# Patient Record
Sex: FEMALE | Race: ASIAN | NOT HISPANIC OR LATINO | URBAN - METROPOLITAN AREA
[De-identification: names, ages, dates, MRNs, and addresses within clinical notes are randomized per-mention and may not be internally consistent; named-entity substitution may affect disease eponyms.]

---

## 2024-04-23 NOTE — PROGRESS NOTES
Beatriz Mandujano female   1985 38 y.o.   [id number]      Chief Complaint  High risk surveillance care, annual mammogram and exam    History Of Present Illness  Beatriz Mandujano is a very pleasant 38 year old  woman [facility] care. She has family history of breast cancer in her mother, age 49. Her mother had genetic testing in 2022, 77 gene panel, negative. She denies breast surgery or biopsy.     BREAST IMAGIN2023 Bilateral screening mammogram, indicates BI-RADS Category 1.      FEMALE HISTORY: menarche age 14, , first birth age 29,  x 6 years, no OCP's, premenopausal, LMP 2024, regular monthly cycles,  had vasectomy, heterogeneously dense tissue     FAMILY CANCER HISTORY:  Mother: Breast cancer, age 49, ILC/LCIS treated with neoadjuvant chemo, bilateral mastectomy and post-operative radiation, negative genetics, also has history of neck cancer, alive and well  Maternal Aunt: Brain cancer, 50s         Surgical History  She has a past surgical history that includes  section, low transverse (10/09/2017) and Other surgical history (10/09/2017).     Social History  She reports that she has never smoked. She has never used smokeless tobacco. No history on file for alcohol use and drug use.    Family History  Family History   Problem Relation Name Age of Onset    Breast cancer Mother  49    Ovarian cancer Father's Sister  60        Allergies  Patient has no known allergies.    Medications  No current outpatient medications      REVIEW OF SYSTEMS    Constitutional:  Negative for appetite change, fatigue, fever and unexpected weight change.   HENT:  Negative for ear pain, hearing loss, nosebleeds, sore throat and trouble swallowing.    Eyes:  Negative for discharge, itching and visual disturbance.   Respiratory:  Negative for cough, chest tightness and shortness of breath.    Cardiovascular:  Negative for chest pain,  palpitations and leg swelling.   Breast: as indicated in HPI  Gastrointestinal:  Negative for abdominal pain, constipation, diarrhea and nausea.   Endocrine: Negative for cold intolerance and heat intolerance.   Genitourinary:  Negative for dysuria, frequency, hematuria, pelvic pain and vaginal bleeding.   Musculoskeletal:  Negative for arthralgias, back pain, gait problem, joint swelling and myalgias.   Skin:  Negative for color change and rash.   Allergic/Immunologic: Negative for environmental allergies and food allergies.   Neurological:  Negative for dizziness, tremors, speech difficulty, weakness, numbness and headaches.   Hematological:  Does not bruise/bleed easily.   Psychiatric/Behavioral:  Negative for agitation, dysphoric mood and sleep disturbance. The patient is not nervous/anxious.         Past Medical History  She has a past medical history of Cluster headache syndrome, unspecified, not intractable (02/12/2018), Migraine without aura, not intractable, without status migrainosus (02/12/2018), and Personal history of other specified conditions.     Physical Exam  Patient is alert and oriented x3 and in a relaxed and appropriate mood. Her gait is steady and hand grasps are equal. Sclera is clear. The breasts are full and nearly symmetrical. The tissue is dense centrally and softer below without palpable abnormalities, discrete nodules or masses. The skin and nipples appear normal. There is a superficial skin tear of right breast at 5:30, 7 cm from the nipple. There is no cervical, supraclavicular or axillary lymphadenopathy. Heart rate and rhythm normal, S1 and S2 appreciated. The lungs are clear to auscultation bilaterally. Abdomen is soft and non-tender.     Physical Exam     Last Recorded Vitals  Vitals:    05/02/24 0805   BP: 104/71   Pulse: 57   SpO2: 97%       Relevant Results   Time was spent viewing digital images of the radiology testing with the patient. I explained the results in depth, along  with suggested explanation for follow up recommendations based on the testing results. BI-RADS Category 1    Imaging  Narrative & Impression   Interpreted By:  Jose Muñoz,   STUDY:  BI MAMMO BILATERAL SCREENING TOMOSYNTHESIS;  5/2/2024 8:04 am      ACCESSION NUMBER(S):  [id number]      ORDERING CLINICIAN:  AARON BAILEY      INDICATION:  Screening.      COMPARISON:  05/01/2023 and 04/22/2022      FINDINGS:  2D and tomosynthesis images were reviewed at 1 mm slice thickness.      Density:  There are areas of scattered fibroglandular tissue.      No suspicious masses or calcifications are identified.      IMPRESSION:  No mammographic evidence of malignancy.      BI-RADS CATEGORY:      BI-RADS Category:  1 Negative.  Recommendation:  Annual Screening.  Recommended Date:  1 Year.  Laterality:  Bilateral.           Assessment/Plan   High risk surveillance care, normal clinical exam and imaging, family history of breast cancer, heterogeneously dense tissue     Plan: Return in one year for bilateral screening mammogram and office visit. Fast MRI optional in November 2024, five year risk 0.7%. Endocrine therapy not recommended at this time.     Patient Discussion/Summary  Your clinical examination and imaging are normal. Please return in one year for bilateral screening mammogram and office visit or sooner if you have any problems or concerns.     High risk breast surveillance care plan:  Yearly mammogram with digital breast tomosynthesis  Twice yearly clinical breast examinations  Breast MRI - Fast MRI optional in November 2024  Monthly self breast examinations  Vitamin D3 2000 IU/daily (over the counter)  Exercise 3-4 times per week for 45-60 minutes  Limit alcohol to 3-4 drinks per week   Eat a healthy low-fat diet with lots of fruits and vegetables  Risk models indicate personal risk of breast cancer in the next five years and lifetime (age 90)  Breast Cancer Risk Assessment Tool (Chen): 5 year risk 0.7%  (average 0.3%) and lifetime risk 19.1% (average 12.5%)  Leslie: 5 year risk 0.7% (average 0.4%) and lifetime risk 19.7% (average 11%)     You can see your health information, review clinical summaries from office visits & test results online when you follow your health with MY  Chart, a personal health record. To sign up go to [email or web address]. If you need assistance with signing up or trouble getting into your account call Patient Communicator Patient Line 24/7 at [phone].    My office phone number is [phone] if you need to get in touch with me or have additional questions or concerns. Thank you for choosing [facility] and trusting me as your healthcare provider. I look forward to seeing you again at your next office visit. I am honored to be a provider on your health care team and I remain dedicated to helping you achieve your health goals.      Violette Styles, APRN-CNP

## 2024-04-25 ENCOUNTER — APPOINTMENT (OUTPATIENT)
Dept: RADIOLOGY | Facility: CLINIC | Age: 39
End: 2024-04-25
Payer: COMMERCIAL

## 2024-05-02 ENCOUNTER — OFFICE VISIT (OUTPATIENT)
Dept: SURGICAL ONCOLOGY | Facility: CLINIC | Age: 39
End: 2024-05-02
Payer: COMMERCIAL

## 2024-05-02 ENCOUNTER — HOSPITAL ENCOUNTER (OUTPATIENT)
Dept: RADIOLOGY | Facility: CLINIC | Age: 39
Discharge: HOME | End: 2024-05-02
Payer: COMMERCIAL

## 2024-05-02 VITALS
BODY MASS INDEX: 29.74 KG/M2 | WEIGHT: 157.4 LBS | DIASTOLIC BLOOD PRESSURE: 71 MMHG | OXYGEN SATURATION: 97 % | SYSTOLIC BLOOD PRESSURE: 104 MMHG | HEART RATE: 57 BPM

## 2024-05-02 VITALS — WEIGHT: 145 LBS | BODY MASS INDEX: 27.38 KG/M2 | HEIGHT: 61 IN

## 2024-05-02 DIAGNOSIS — Z12.39 ENCOUNTER FOR OTHER SCREENING FOR MALIGNANT NEOPLASM OF BREAST: ICD-10-CM

## 2024-05-02 DIAGNOSIS — Z12.39 BREAST CANCER SCREENING, HIGH RISK PATIENT: ICD-10-CM

## 2024-05-02 DIAGNOSIS — R92.30 DENSE BREAST TISSUE: Primary | ICD-10-CM

## 2024-05-02 PROCEDURE — 99214 OFFICE O/P EST MOD 30 MIN: CPT | Performed by: NURSE PRACTITIONER

## 2024-05-02 PROCEDURE — 77067 SCR MAMMO BI INCL CAD: CPT | Mod: BILATERAL PROCEDURE | Performed by: STUDENT IN AN ORGANIZED HEALTH CARE EDUCATION/TRAINING PROGRAM

## 2024-05-02 PROCEDURE — 77067 SCR MAMMO BI INCL CAD: CPT

## 2024-05-02 PROCEDURE — 77063 BREAST TOMOSYNTHESIS BI: CPT | Mod: BILATERAL PROCEDURE | Performed by: STUDENT IN AN ORGANIZED HEALTH CARE EDUCATION/TRAINING PROGRAM

## 2024-05-02 PROCEDURE — 99214 OFFICE O/P EST MOD 30 MIN: CPT | Mod: 25 | Performed by: NURSE PRACTITIONER

## 2024-05-02 ASSESSMENT — PAIN SCALES - GENERAL: PAINLEVEL: 3

## 2024-05-02 NOTE — PATIENT INSTRUCTIONS
Your clinical examination and imaging are normal. Please return in one year for bilateral screening mammogram and office visit or sooner if you have any problems or concerns.     High risk breast surveillance care plan:  Yearly mammogram with digital breast tomosynthesis  Twice yearly clinical breast examinations  Breast MRI - Fast MRI optional in November 2024  Monthly self breast examinations  Vitamin D3 2000 IU/daily (over the counter)  Exercise 3-4 times per week for 45-60 minutes  Limit alcohol to 3-4 drinks per week   Eat a healthy low-fat diet with lots of fruits and vegetables  Risk models indicate personal risk of breast cancer in the next five years and lifetime (age 90)  Breast Cancer Risk Assessment Tool (Chne): 5 year risk 0.7% (average 0.3%) and lifetime risk 19.1% (average 12.5%)  Leslie: 5 year risk 0.7% (average 0.4%) and lifetime risk 19.7% (average 11%)     You can see your health information, review clinical summaries from office visits & test results online when you follow your health with MY  Chart, a personal health record. To sign up go to [email or web address]. If you need assistance with signing up or trouble getting into your account call StudyRoom Patient Line 24/7 at [phone].    My office phone number is [phone] if you need to get in touch with me or have additional questions or concerns. Thank you for choosing [facility] and trusting me as your healthcare provider. I look forward to seeing you again at your next office visit. I am honored to be a provider on your health care team and I remain dedicated to helping you achieve your health goals.

## 2024-06-05 ENCOUNTER — OFFICE VISIT (OUTPATIENT)
Dept: OBSTETRICS AND GYNECOLOGY | Facility: CLINIC | Age: 39
End: 2024-06-05
Payer: COMMERCIAL

## 2024-06-05 VITALS
WEIGHT: 156 LBS | HEIGHT: 61 IN | BODY MASS INDEX: 29.45 KG/M2 | DIASTOLIC BLOOD PRESSURE: 68 MMHG | SYSTOLIC BLOOD PRESSURE: 114 MMHG

## 2024-06-05 DIAGNOSIS — N39.3 PRIMARY STRESS URINARY INCONTINENCE: ICD-10-CM

## 2024-06-05 DIAGNOSIS — Z01.419 WELL WOMAN EXAM WITH ROUTINE GYNECOLOGICAL EXAM: Primary | ICD-10-CM

## 2024-06-05 PROCEDURE — 99395 PREV VISIT EST AGE 18-39: CPT | Performed by: OBSTETRICS & GYNECOLOGY

## 2024-06-05 PROCEDURE — 1036F TOBACCO NON-USER: CPT | Performed by: OBSTETRICS & GYNECOLOGY

## 2024-06-05 RX ORDER — CHOLECALCIFEROL (VITAMIN D3) 50 MCG
TABLET ORAL
COMMUNITY
Start: 2022-03-18

## 2024-06-05 NOTE — PATIENT INSTRUCTIONS
Pelvic Floor Physical Therapy (PFPT) is an evidence-based practice to improve disorders of muscle, connective tissue, and nerves within the pelvis and abdomen. It can be extremely helpful for conditions such as pelvic pain, sexual pain, prolapse, urinary incontinence, diastasis recti, and general core weakness. During a Pelvic PT session, a trained and licensed professional therapist will do a physical exam, which may include a pelvic exam, to identify areas that need treatment. They will then guide you through exercises to help you feel better. Most people experience improvement in symptoms following 4-6 sessions of Pelvic PT, though more sessions may be needed for full resolution of symptoms.     Pelvic PT Provider Location Contact Website   [facility]   Pelvic Floor Physical Therapy   [facility]  [address] [place] [address]  [phone]    [facility]  [address], [address] [place] [address]  [phone]    [facility]  [address] [place], [place] [id number] [phone]     [facility]  [address] [place] [address]  [phone]    [facility]  [address] [place] [address]  [phone]    [facility]  [address] [place] [address]  [phone] see numbers listed [email or web address]   Tiffany Pinto DPT, LMT [facility]  [address] [place] [address] Phone: [phone]  Fax: [phone] [email or web address]   Justyna Ford DPT [facility]  [address] [address] Email: [email or web address]   Gala Morales DPT [facility]  [address]  [place] [address] Phone: [phone] [email or web address]   [facility] [place]   (ask for Nimisha) [address]  [place] [address]   Phone:  [phone] [email or web address]   Nicole Valdivia, PT, DPT [address]  [place] [address] Phone: [phone] [email or web address]   Justyna Diaz, PT, DPT FAAOMPT, [facility] &  [facility]  [address]  [place] [id number]     Phone: [phone]  Email: [email or web address]     You can also go on [email or web address] to look for other Pelvic Floor therapists near you by zip code.

## 2024-06-05 NOTE — PROGRESS NOTES
"Assessment   PLAN  Thank you for coming to your annual exam. We discussed healthy lifestyle, well woman screening, and other diagnoses listed below.    Beatriz was seen today for annual exam.  Diagnoses and all orders for this visit:  Well woman exam with routine gynecological exam (Primary)  Primary stress urinary incontinence  -     Referral to Physical Therapy; Future    Please return for your next visit in 1 year.    Sandra Whelan MD    Subjective     Beatriz Mandujano is a 38 y.o. female who is here for a routine exam.   PCP = Catrachita Nicole, DO  Goes by \"Karen\"    APE Concerns: none    Other Concerns: Occ JEANMARIE.     OB History    Para Term  AB Living   4 3 3   1 3   SAB IAB Ectopic Multiple Live Births   1       3      # Outcome Date GA Lbr Kelvin/2nd Weight Sex Delivery Anes PTL Lv   4 Term 19 40w0d  3.09 kg F    DANIELA   3 Term 17 39w6d / 01:16 3.345 kg F Vag-Spont EPI N DANIELA   2 SAB 2016 6w0d          1 Term 08/10/15 39w5d  3.204 kg M CS-LTranv Spinal N DANIELA      Complications: Malpresentation of fetus ([facility]-HCC)     GynHx:  Menarche: 13  Menstrual Pattern: Reg menses without dysmenorrhea or menorrhagia  STIs: denies  Sexual Activity: men, monogamous, no complaints  Contraception:  sp vasectomy    Pap Hx:  2023 - neg cotest    Preventative:  Last mammogram: BIRAD Cat 1 May 2024.   Last Colonoscopy: due age 47  DM Screening: A1C 4.9% in   DEXA: due age 67  HPV vaccination: not received  Exercise: none regular  Diet: no restrictions  Seat Belt Use: always    SoHx:  Living Situation: with  Amilcar and 3 kids (1 boy, 2 girls). Siblings (Tyrell) also live in town.   School/Employment: [employer]  Substance: No T/D. EtOH social.  Abuse: denies  Depression Screen: negative    Past Medical History:   Diagnosis Date    Migraine without aura, not intractable, without status migrainosus 2018    Common migraine without aura       Past Surgical History:   Procedure Laterality " "Date     SECTION, LOW TRANSVERSE  10/09/2017     Section Low Transverse    MOUTH SURGERY  10/09/2017    Oral Surgery Tooth Extraction [place] Tooth     Family History   Problem Relation Name Age of Onset    Breast cancer Mother  49        sp hereditary testing and neg    Brain cancer Mother's Sister      Ovarian cancer Father's Sister  60   Saw breast specialist in the past and was told she could go to routine screening    Objective   /68   Ht 1.549 m (5' 1\")   Wt 70.8 kg (156 lb)   LMP 2024   BMI 29.48 kg/m²      General:   Alert and oriented, in no acute distress   Neck: Supple. No visible thyromegaly.    Breast/Axilla: Normal to palpation bilaterally without masses, skin changes, or nipple discharge.    Abdomen: Soft, non-tender, without masses or organomegaly   Vulva: Normal architecture without erythema, masses, or lesions.    Vagina: Normal mucosa without lesions, masses, or atrophy. No abnormal vaginal discharge.    Cervix: Normal without masses, lesions, or signs of cervicitis.    Uterus: Normal mobile, non-enlarged uterus    Adnexa: Normal without masses or lesions   Pelvic Floor No POP noted. No high tone pelvic floor    Psych Normal affect. Normal mood.      "

## 2024-09-14 ENCOUNTER — OFFICE VISIT (OUTPATIENT)
Dept: URGENT CARE | Age: 39
End: 2024-09-14
Payer: COMMERCIAL

## 2024-09-14 DIAGNOSIS — J02.9 SORE THROAT: Primary | ICD-10-CM

## 2024-09-14 DIAGNOSIS — U07.1 COVID: ICD-10-CM

## 2024-09-14 LAB — POC RAPID STREP: NEGATIVE

## 2024-09-14 ASSESSMENT — ENCOUNTER SYMPTOMS
ENDOCRINE NEGATIVE: 1
CARDIOVASCULAR NEGATIVE: 1
RESPIRATORY NEGATIVE: 1
PSYCHIATRIC NEGATIVE: 1
VOICE CHANGE: 1
EYES NEGATIVE: 1
GASTROINTESTINAL NEGATIVE: 1
FEVER: 1
SORE THROAT: 1
HEMATOLOGIC/LYMPHATIC NEGATIVE: 1

## 2024-09-14 NOTE — PROGRESS NOTES
Subjective   Patient ID: Beatriz Mandujano is a 38 y.o. female. They present today with a chief complaint of Sore Throat and Fever.    History of Present Illness  39 y/o F tested positive for covid 3 days ago and having congestion and sore throat. Education provided her s/s are related to covid and offered rapid strep, declined. Pt states she has a fever over 4 days, but goes down with fever reducing medications. Denies SOB, CP, HA, n/v      History provided by:  Patient  Sore Throat   Associated symptoms include congestion.   Fever   Associated symptoms include congestion and a sore throat.       Past Medical History  Allergies as of 2024 - Reviewed 2024   Allergen Reaction Noted    Penicillins Other and Unknown 2003       (Not in a hospital admission)       Past Medical History:   Diagnosis Date    Migraine without aura, not intractable, without status migrainosus 2018    Common migraine without aura       Past Surgical History:   Procedure Laterality Date     SECTION, LOW TRANSVERSE  10/09/2017     Section Low Transverse    MOUTH SURGERY  10/09/2017    Oral Surgery Tooth Extraction [place] Tooth        reports that she has never smoked. She has never used smokeless tobacco.    Review of Systems  Review of Systems   Constitutional:  Positive for fever.   HENT:  Positive for congestion, postnasal drip, sore throat and voice change.    Eyes: Negative.    Respiratory: Negative.     Cardiovascular: Negative.    Gastrointestinal: Negative.    Endocrine: Negative.    Genitourinary: Negative.    Skin: Negative.    Hematological: Negative.    Psychiatric/Behavioral: Negative.                                    Objective    There were no vitals filed for this visit.  No LMP recorded.    Physical Exam  Constitutional:       Appearance: Normal appearance.   HENT:      Head: Normocephalic.      Nose: Congestion present.      Mouth/Throat:      Mouth: Mucous membranes are dry.       Pharynx: Oropharynx is clear. Posterior oropharyngeal erythema present.   Eyes:      Extraocular Movements: Extraocular movements intact.   Pulmonary:      Effort: Pulmonary effort is normal.   Musculoskeletal:         General: Normal range of motion.      Cervical back: Normal range of motion and neck supple.   Skin:     General: Skin is warm and dry.   Neurological:      General: No focal deficit present.      Mental Status: She is alert and oriented to person, place, and time.   Psychiatric:         Mood and Affect: Mood normal.         Behavior: Behavior normal.         Procedures    Point of Care Test & Imaging Results from this visit  No results found for this visit on 09/14/24.   No results found.    Diagnostic study results (if any) were reviewed by ALEXANDER Alonzo.    Assessment/Plan   Allergies, medications, history, and pertinent labs/EKGs/Imaging reviewed by ALEXANDER Alonzo.     Medical Decision Making  Pt took rapid at home- COVID- POSITIVE   S/s are consistent with covid with PE signs of erythema to posterior pharynx congruent with covid   Rapid strep conducted since son is +- NEG    Adhere to CDC guidelines and stay home for at least 2-5 days and isolate from others in your home.   You are likely most infectious during these first 5 days, but new guidelines state if you are fever free w/o fever reducing medications you can go out in public with a tight fitting mask for at least 5 days   Wear a high-quality mask if you must be around others at home and in public.   Do not go places where you are unable to wear a mask. For travel guidance, see CDC’s Travel webpage.   Do not travel.   Stay home and separate from others as much as possible.   Use a separate bathroom, if possible.   Take steps to improve ventilation at home, if possible.   Don’t share personal household items, like cups, towels, and utensils.   Monitor your symptoms. If you have an emergency warning sign (like trouble  breathing), seek emergency medical care immediately.        Managing COVID-19 symptoms        While recovering at home, keep track of your symptoms. Follow your provider's instructions and take medicines as prescribed. If you have severe symptoms, call 911 or the local emergency number.     To help manage symptoms of COVID-19, try the following tips.     Rest and drink plenty of fluids.     Acetaminophen (Tylenol) and ibuprofen (Advil, Motrin) help reduce fever. Sometimes, providers advise you to use both types of medicine. Take the recommended amount to reduce fever. DO NOT use ibuprofen in children 6 months or younger.     A lukewarm bath or sponge bath may help cool a fever. Keep taking medicine -- otherwise your temperature might go back up.     For a sore throat, gargle several times a day with warm salt water (1/2 tsp or 3 grams of salt in 1 cup or 240 milliliters of water). Drink warm liquids such as tea, or lemon tea with honey. Suck on hard candies or throat lozenges.     Use a vaporizer or take a steamy shower to increase moisture in the air, reduce nasal congestion, and help soothe a dry throat and cough.     Saline spray can also help reduce nasal congestion.     To help relieve diarrhea, drink 8 to 10 glasses of clear liquids, such as water, diluted fruit juices, and clear soups to make up for fluid loss. Avoid dairy products, fried foods, caffeine, alcohol, and carbonated drinks.     If you have nausea, eat small meals with bland foods. Avoid foods with strong smells. Try to drink 8 to 10 glasses of water or clear fluids every day to stay hydrated.     Do not smoke, and stay away from secondhand smoke.      HOME CARE INSTRUCTIONS:     --- Drink plenty of water. Water helps thin the mucus so your sinuses can drain more easily.     --- Use a humidifier.     --- Inhale steam 3 to 4 times a day (for example, sit in the bathroom with the shower running).     --- Apply a warm, moist washcloth to your face 3  to 4 times a day, or as directed by your caregiver.     --- Take over-the-counter or prescription medicines for pain, discomfort, or fever only as directed by your caregiver.     SEEK FURTHER TREATMENT IF YOU:     --- You have increasing pain or severe headaches.     --- You have nausea, vomiting, or drowsiness.     --- You have swelling around your face.     --- You have vision problems.     --- You have a stiff neck.     --- You have difficulty breathing.      Orders and Diagnoses  Diagnoses and all orders for this visit:  Sore throat  -     POCT rapid strep A manually resulted  COVID      Medical Admin Record      Follow Up Instructions  PCP 1 week    Patient disposition: Home    Electronically signed by ALEXANDER Alonzo  5:05 PM

## 2024-10-14 ENCOUNTER — APPOINTMENT (OUTPATIENT)
Dept: PRIMARY CARE | Facility: CLINIC | Age: 39
End: 2024-10-14
Payer: COMMERCIAL

## 2025-02-20 ENCOUNTER — EVALUATION (OUTPATIENT)
Dept: PHYSICAL THERAPY | Facility: CLINIC | Age: 40
End: 2025-02-20
Payer: COMMERCIAL

## 2025-02-20 DIAGNOSIS — N39.3 PRIMARY STRESS URINARY INCONTINENCE: Primary | ICD-10-CM

## 2025-02-20 DIAGNOSIS — M62.89 MUSCLE TIGHTNESS: ICD-10-CM

## 2025-02-20 DIAGNOSIS — M62.81 MUSCLE WEAKNESS: ICD-10-CM

## 2025-02-20 PROCEDURE — 97162 PT EVAL MOD COMPLEX 30 MIN: CPT | Mod: GP | Performed by: PHYSICAL THERAPIST

## 2025-02-20 PROCEDURE — 97535 SELF CARE MNGMENT TRAINING: CPT | Mod: GP | Performed by: PHYSICAL THERAPIST

## 2025-02-20 PROCEDURE — 97110 THERAPEUTIC EXERCISES: CPT | Mod: GP | Performed by: PHYSICAL THERAPIST

## 2025-02-20 NOTE — PROGRESS NOTES
Physical Therapy  Physical Therapy Pelvic Floor Evaluation    Name: Beatriz Mandujano  MRN: [id number]  : 1985  Encounter Date: 2025  Visit Count: 1     Time Calculation  Start Time: 0700  Stop Time: 0800  Time Calculation (min): 60 min    Insurance: MMO, no auth  Visit # 1 of 20 For     Current Problem:  1. Primary stress urinary incontinence  Referral to Physical Therapy          General  Reason for Referral: JEANMARIE  Referred By: Tip CUEVAS Fall Risk Score (The score of 4 or more indicates an increased risk of falling): 0  Vital Signs     Pain       Subjective  Chief Complaint: stress urinary incontinence with exercises/high level activity - unexpected  - never lost complete control  - 3 children, 9 -7-5 years old  Onset: several years (7 years)  DELORES: unknown    Current Condition: same    PAIN  Intensity (0-10): 0 - lower back soreness with prolonged standing  Location:   Description:     Aggravating Factors: running, jumping, soccer, hitting baseball  Relieving Factors: avoiding activities    Relevant Information (PMH & Previous Tests/Imaging): NA  Previous Interventions/Treatments: No    Prior Level of Function (PLOF)  Exercise/Physical Activity: running treadmill 2-4x/week, 20-30min, incline walk, peleton charlie (weight workouts)  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 No  Prolapse? No  Painful Forrest or vaginal penetration? No    BLADDER  Frequency of Urination  Daytime:  5-8  Nighttime: x0  Screening = Recurrent infections/UTIs? No  Urinary Incontinence/Leakage  Frequency (day/week/month)? Intermittent  Present with cough and/or sneeze? Sometimes, unexpected  Present with physical activity and/or  exertion? Yes  How much urine do you leak? Dribble  Do you wear any barriers, such as pantishields or pads? No  Average Fluid Intake (glasses/day): water, tea, chocolate milk, 6-8 glasses     BOWEL  Frequency of Bowel Movements: regular   Average Fiber Intake (per day): good       Objective  Patient was educated on pelvic floor anatomy, function, and dysfunction.   Patient was educated on an orthopedic assessment & external pelvic floor assessment technique and verbalized consent.   The patient is aware they have full autonomy and can stop the assessment at any time.     Standing Assessment:   Posture: wnl  SL Stance: mild hip  drop  DL Squat: wnl  SL Squat: dkv and att  Standing Lumbar Mobility: wnl    Supine Movement Assessment   SLR: wnl  Bridge: wnl - hip drop with SL march  Hip PROM: mild tightness IR/ER/flex  MMT: sup hip: 5/5    Supine Mobility Assessment  - Hamstrings: moderate tightness  - Piriformis: moderate tightness    Diaphragmatic Breathing: good  Rib Palpation/Positioning: wnl  Assess Abdomen  Rectus Abdominus: wnl  Obliques: wnl  Diastasis Recti: negative  Transverse Abdominus Contraction: good    OUTCOMES MEASURE:  [place] Pelvic Dysfunction Screening Tool : negative, 1    NIH-CPSI  -Pain: 0  - Urinary Symp: 0  - QOL: 5    Goals:   Active       PT Problem       PT Goal 1       Start:  02/20/25    Expected End:  05/21/25       Patient will return to prior level of function with minimal to no pain and without limitations for participation in ADLs, health, and exercise.   Patient demonstrate home exercise program adherence to supplement symptom reduction and functional gains made in clinic  Patient will reports minimal to no pain for participation in ADLs and return to PLOF.   Patient will demonstrate coordination of pelvic floor, strength & relaxation wnl for return to ADLs and PLOF without restriction.   Pt will demonstrate improvement on the outcome measure score to demonstrate overall improved  functional abilities and quality of life.              Education: home exercise program, plan of care, activity modifications, pain management, and injury pathology  Bladder Habits: Just in Case Pee, Hover over the toilet, relax & breathe, squatty potty use  Hydration Recommendation: 50% of your body wt (pounds) in fluid ounces  Bladder Irritants: caffeine, artificial sweeteners, carbonated beverages, alcohol  Fiber Intake Recommendation: 25-30g/day    Treatments:   Education: home exercise program, plan of care, activity modifications, pain management, and injury pathology    Access Code: [email or web address]  - Seated Diaphragmatic Breathing  - 1-3 x daily - 3-5 breaths hold  - Supine Pelvic Floor Contraction  - 1-3 x daily - 10 reps - 3-5 seconds hold  - Hooklying Transversus Abdominis Palpation  - 3-5 x weekly - 5-10 reps - 2 breaths hold  - Supine Hip Adduction Isometric with Ball  - 3-5 x weekly - 3 sets - 10 reps  - Hooklying Clamshell with Resistance  - 3-5 x weekly - 3 sets - 10 reps  - Bridge  - 3-5 x weekly - 3 sets - 10 reps    Tx Codes:    Eval Mod   Self Care x1   TE x1    Plan for Next Visit: assess response to hep  - core/hip/PF strength & endurance progression  - open to intravaginal assessment next visit, chaperone discussed: declines    Assessment: Patient presents with signs and symptoms consistent with stress urinary incontinence, resulting in limited participation in pain-free ADLs and inability to perform at their prior level of function. Pt would benefit from physical therapy to address the impairments found & listed previously in the objective section in order to return to safe and pain-free ADLs and prior level of function.   PT Assessment Results: muscle weakness, muscle tightness   Rehab Prognosis: good   Clinical Presentation: stable    Plan:   Planned Interventions include: therapeutic exercise, self-care home management, manual therapy, therapeutic activities, gait training, neuromuscular  coordination, aquatic therapy  Patient and/or family understands and agrees with goals and plan documented: yes  Frequency: 2x/month  Duration: 2-4 months     Suzie Jackson, PT

## 2025-03-13 ENCOUNTER — TREATMENT (OUTPATIENT)
Dept: PHYSICAL THERAPY | Facility: CLINIC | Age: 40
End: 2025-03-13
Payer: COMMERCIAL

## 2025-03-13 DIAGNOSIS — N39.3 PRIMARY STRESS URINARY INCONTINENCE: ICD-10-CM

## 2025-03-13 DIAGNOSIS — M62.81 MUSCLE WEAKNESS: ICD-10-CM

## 2025-03-13 DIAGNOSIS — M62.89 MUSCLE TIGHTNESS: ICD-10-CM

## 2025-03-13 PROCEDURE — 97112 NEUROMUSCULAR REEDUCATION: CPT | Mod: GP | Performed by: PHYSICAL THERAPIST

## 2025-03-13 PROCEDURE — 97140 MANUAL THERAPY 1/> REGIONS: CPT | Mod: GP | Performed by: PHYSICAL THERAPIST

## 2025-03-13 PROCEDURE — 97535 SELF CARE MNGMENT TRAINING: CPT | Mod: GP | Performed by: PHYSICAL THERAPIST

## 2025-03-13 NOTE — PROGRESS NOTES
Physical Therapy  Physical Therapy Pelvic Floor    Name: Beatriz Mandujano  MRN: [id number]  : 1985  Encounter Date: 3/13/2025  Visit Count: 2     Time Calculation  Start Time: 0700  Stop Time: 0800  Time Calculation (min): 60 min    Insurance: MMO, no auth  Visit # 2 of 20 For     Current Problem:  1. Primary stress urinary incontinence  Follow Up In Physical Therapy      2. Muscle weakness  Follow Up In Physical Therapy      3. Muscle tightness  Follow Up In Physical Therapy          General     Precautions     Vital Signs     Pain       Subjective  Chief Complaint: stress urinary incontinence with exercises/high level activity - unexpected  - never lost complete control  - 3 children, 9 -7-5 years old  Onset: several years (7 years)  DELORES: unknown    3/13/25:   - fell down stairs, possible mild concussion  - exercises a couple of times since last visit, unsure about contraction of deep core/kegel  - no pain, mild tightness stiffness of neck and shoulders, also could be associated with life stressors    PAIN  Intensity (0-10): 0 - lower back soreness with prolonged standing  Location:   Description:     Aggravating Factors: running, jumping, soccer, hitting baseball  Relieving Factors: avoiding activities    Prior Level of Function (PLOF)  Exercise/Physical Activity: running treadmill 2-4x/week, 20-30min, incline walk, peleton charlie (weight workouts)  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 Intermittent  Present with cough and/or sneeze? Sometimes, unexpected  Present with physical activity and/or exertion? Yes  How much urine do you leak? Dribble  Do you wear any barriers, such as pantishields or pads? No  Average Fluid Intake (glasses/day): water, tea,  chocolate milk, 6-8 glasses     Objective  Posture: wnl  SL Stance: mild hip  drop  DL Squat: wnl  SL Squat: dkv and att  Standing Lumbar Mobility: wnl  SLR: wnl  Bridge: wnl - hip drop with SL march  Hip PROM: mild tightness IR/ER/flex  MMT: sup hip: 5/5  - Hamstrings: moderate tightness  - Piriformis: moderate tightness  Diaphragmatic Breathing: good  Rib Palpation/Positioning: wnl  Assess Abdomen  Rectus Abdominus: wnl  Obliques: wnl  Diastasis Recti: negative  Transverse Abdominus Contraction: good    Patient was educated on pelvic floor anatomy, function, and dysfunction.   Patient was educated on an intra-vaginal pelvic floor assessment technique and verbalized consent.   The patient is aware they have full autonomy and can stop the assessment at any time.   Patient was offered a chaperone and declined one be present.   Chaperone: Declined    Physical therapist and patient reviewed  Policy GM-34 and patient was informed of their right for the opportunity of a chaperone to be present in the room during intimate examinations.  The offer of a chaperone was made to the patient.   Patient was offered the opportunity for either a family/friend or 3rd party chaperone for evaluation/treatment.  Patient declined a chaperone.      Perineal Observation - At Rest: wnl  Contraction: visible (P>A)  Relaxation: visible    Bony Palpation  Pubic symphysis: no tenderness  Ischial Tuberosity: no tenderness  External Soft Tissue Palpation/Pelvic Clock - no tenderness    Internal Intra-vaginal Exam  - Layer 1/2/3: mild global tension throughout layers 1/2/3 - no tenderness reported  - Layer 3 Strength-MMT = 4/5  Reference Values  0 = None  1 = flicker  2 = contract only  3 = moderate (posterior > anterior)  4 = contract, lift, compression present  5 = strong lift & compression with posterior resistance    Laycock Quantitative Assessment Scale (PERF)  Power = 4/5  Endurance = 5-6/10 (decreased with further  repetitions)  Repetitions + 4 sec rest interval = 3    Treatments:   Education: home exercise program, plan of care, activity modifications, pain management, and injury pathology  - Female Sexual Med Department  - Registered Dietician through [employer]  - Differences between Functional, Integrative, Primary Care, and Naturopathic Physicians    Access Code: [email or web address]  - Seated Diaphragmatic Breathing  - 1-3 x daily - 3-5 breaths hold  - Supine Pelvic Floor Contraction  - 1-3 x daily - 10 reps - 3-5 seconds hold  - Hooklying Transversus Abdominis Palpation  - 3-5 x weekly - 5-10 reps - 2 breaths hold  - Supine Hip Adduction Isometric with Ball  - 3-5 x weekly - 3 sets - 10 reps  - Hooklying Clamshell with Resistance  - 3-5 x weekly - 3 sets - 10 reps  - Bridge  - 3-5 x weekly - 3 sets - 10 reps     Tx Codes:    Self Care x2   NM x1   Man x1    Plan for Next Visit: assess response to hep  - core/hip/PF strength & endurance progression  - open to intravaginal assessment next visit, chaperone discussed: declines    Assessment: Patient presents with signs and symptoms consistent with stress urinary incontinence, resulting in limited participation in pain-free ADLs and inability to perform at their prior level of function. Pt would benefit from physical therapy to address the impairments found & listed previously in the objective section in order to return to safe and pain-free ADLs and prior level of function.   PT Assessment Results: muscle weakness, muscle tightness   Rehab Prognosis: good   Clinical Presentation: stable  - intravaginal pelvic floor assessment performed today and revealed pelvic floor muscle weakness (4/5) and lacking endurance (5-6 sec hold)    Plan:   Planned Interventions include: therapeutic exercise, self-care home management, manual therapy, therapeutic activities, gait training, neuromuscular coordination, aquatic therapy  Patient and/or family understands and agrees with goals and plan  documented: yes  Frequency: 2x/month  Duration: 2-4 months     Suzie Jackson, PT

## 2025-03-27 ENCOUNTER — TREATMENT (OUTPATIENT)
Dept: PHYSICAL THERAPY | Facility: CLINIC | Age: 40
End: 2025-03-27
Payer: COMMERCIAL

## 2025-03-27 DIAGNOSIS — M62.89 MUSCLE TIGHTNESS: ICD-10-CM

## 2025-03-27 DIAGNOSIS — M62.81 MUSCLE WEAKNESS: ICD-10-CM

## 2025-03-27 DIAGNOSIS — N39.3 PRIMARY STRESS URINARY INCONTINENCE: ICD-10-CM

## 2025-03-27 PROCEDURE — 97110 THERAPEUTIC EXERCISES: CPT | Mod: GP | Performed by: PHYSICAL THERAPIST

## 2025-03-27 NOTE — PROGRESS NOTES
Physical Therapy  Physical Therapy Pelvic Floor    Name: Beatriz Mandujano  MRN: [id number]  : 1985  Encounter Date: 3/27/2025  Visit Count: 3     Time Calculation  Start Time: 0710  Stop Time: 0750  Time Calculation (min): 40 min    Insurance: MMO, no auth  Visit # 3 of 20 For     Current Problem:  1. Primary stress urinary incontinence  Follow Up In Physical Therapy      2. Muscle weakness  Follow Up In Physical Therapy      3. Muscle tightness  Follow Up In Physical Therapy          General     Precautions     Vital Signs     Pain       Subjective  Chief Complaint: stress urinary incontinence with exercises/high level activity - unexpected  - never lost complete control  - 3 children, 9 -7-5 years old  Onset: several years (7 years)  DELORES: unknown    3/13/25:   - fell down stairs, possible mild concussion  - exercises a couple of times since last visit, unsure about contraction of deep core/kegel  - no pain, mild tightness stiffness of neck and shoulders, also could be associated with life stressors    3/27/25:   - no leaking, but hasn't been doing high impact activities recently  Hep compliance: yes    PAIN  Intensity (0-10): 0 - lower back soreness with prolonged standing  Location:   Description:     Aggravating Factors: running, jumping, soccer, hitting baseball  Relieving Factors: avoiding activities    Prior Level of Function (PLOF)  Exercise/Physical Activity: running treadmill 2-4x/week, 20-30min, incline walk, peleton charlie (weight workouts)  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 Intermittent  Present with cough and/or sneeze? Sometimes, unexpected  Present with physical activity and/or exertion? Yes  How much urine do you leak? Dribble  Do you wear  any barriers, such as pantishields or pads? No  Average Fluid Intake (glasses/day): water, tea, chocolate milk, 6-8 glasses    Objective  Posture: wnl  SL Stance: mild hip  drop  DL Squat: wnl  SL Squat: dkv and att  Standing Lumbar Mobility: wnl  SLR: wnl  Bridge: wnl - hip drop with SL march  Hip PROM: mild tightness IR/ER/flex  MMT: sup hip: 5/5  - Hamstrings: moderate tightness  - Piriformis: moderate tightness  Diaphragmatic Breathing: good  Rib Palpation/Positioning: wnl  Assess Abdomen  Rectus Abdominus: wnl  Obliques: wnl  Diastasis Recti: negative  Transverse Abdominus Contraction: good    Patient was educated on pelvic floor anatomy, function, and dysfunction.   Patient was educated on an intra-vaginal pelvic floor assessment technique and verbalized consent.   The patient is aware they have full autonomy and can stop the assessment at any time.   Patient was offered a chaperone and declined one be present.   Chaperone: Declined    Physical therapist and patient reviewed  Policy GM-34 and patient was informed of their right for the opportunity of a chaperone to be present in the room during intimate examinations.  The offer of a chaperone was made to the patient.   Patient was offered the opportunity for either a family/friend or 3rd party chaperone for evaluation/treatment.  Patient declined a chaperone.      Perineal Observation - At Rest: wnl  Contraction: visible (P>A)  Relaxation: visible    Bony Palpation  Pubic symphysis: no tenderness  Ischial Tuberosity: no tenderness  External Soft Tissue Palpation/Pelvic Clock - no tenderness    Internal Intra-vaginal Exam  - Layer 1/2/3: mild global tension throughout layers 1/2/3 - no tenderness reported  - Layer 3 Strength-MMT = 4/5  Reference Values  0 = None  1 = flicker  2 = contract only  3 = moderate (posterior > anterior)  4 = contract, lift, compression present  5 = strong lift & compression with posterior resistance    Laycock Quantitative  Assessment Scale (PERF)  Power = 4/5  Endurance = 5-6/10 (decreased with further repetitions)  Repetitions + 4 sec rest interval = 3    Treatments:   Education: home exercise program, plan of care, activity modifications, pain management, and injury pathology  - Female Sexual Med Department  - Registered Dietician through [employer]  - Differences between Functional, Integrative, Primary Care, and Naturopathic Physicians    Access Code: [email or web address]  ACTIVATION  - Seated Diaphragmatic Breathing  - 1-3 x daily - 3-5 breaths hold  - Supine Pelvic Floor Contraction  - 1-3 x daily - 10 reps - 3-5 seconds hold  - Hooklying Transversus Abdominis Palpation  - 3-5 x weekly - 5 reps - 2 breaths hold    STRENGTH  - Bridge  - 3-5 x weekly - 10 reps  - Supine Bridge with Mini Swiss Ball Between Knees  - 3-5 x weekly - 3 sets - 10 reps  - Bridge with Hip Abduction and Resistance  - 3-5 x weekly - 3 sets - 10 reps  - Supine Hip Adduction Isometric with Ball  - 3-5 x weekly - 3 sets - 10 reps  - Straight Leg Raise  - 3-5 x weekly - 3 sets - 10 reps  - Sidelying Hip Abduction  - 3-5 x weekly - 3 sets - 10 reps  - Beginner Prone Single Leg Raise  - 3-5 x weekly - 3 sets - 10 reps    Tx Codes:    Self Care x2   NM x1   Man x1    Plan for Next Visit: assess response to hep  - core/hip/PF strength & endurance progression  - open to intravaginal assessment next visit, chaperone discussed: declines    Assessment: Patient presents with signs and symptoms consistent with stress urinary incontinence, resulting in limited participation in pain-free ADLs and inability to perform at their prior level of function. Pt would benefit from physical therapy to address the impairments found & listed previously in the objective section in order to return to safe and pain-free ADLs and prior level of function.   PT Assessment Results: muscle weakness, muscle tightness   Rehab Prognosis: good   Clinical Presentation: stable  - intravaginal pelvic  floor assessment performed today and revealed pelvic floor muscle weakness (4/5) and lacking endurance (5-6 sec hold)  - improved deep core activation and breathing with exercises, tolerated progression well    Plan:   Planned Interventions include: therapeutic exercise, self-care home management, manual therapy, therapeutic activities, gait training, neuromuscular coordination, aquatic therapy  Patient and/or family understands and agrees with goals and plan documented: yes  Frequency: 2x/month  Duration: 2-4 months     Suzie Jackson, PT

## 2025-04-08 ASSESSMENT — DERMATOLOGY QUALITY OF LIFE (QOL) ASSESSMENT
WHAT SINGLE SKIN CONDITION LISTED BELOW IS THE PATIENT ANSWERING THE QUALITY-OF-LIFE ASSESSMENT QUESTIONS ABOUT: NONE OF THE ABOVE
RATE HOW BOTHERED YOU ARE BY SYMPTOMS OF YOUR SKIN PROBLEM (EG, ITCHING, STINGING BURNING, HURTING OR SKIN IRRITATION): 0 - NEVER BOTHERED
RATE HOW EMOTIONALLY BOTHERED YOU ARE BY YOUR SKIN PROBLEM (FOR EXAMPLE, WORRY, EMBARRASSMENT, FRUSTRATION): 4
RATE HOW BOTHERED YOU ARE BY EFFECTS OF YOUR SKIN PROBLEMS ON YOUR ACTIVITIES (EG, GOING OUT, ACCOMPLISHING WHAT YOU WANT, WORK ACTIVITIES OR YOUR RELATIONSHIPS WITH OTHERS): 0 - NEVER BOTHERED
DATE THE QUALITY-OF-LIFE ASSESSMENT WAS COMPLETED: 67303
RATE HOW BOTHERED YOU ARE BY EFFECTS OF YOUR SKIN PROBLEMS ON YOUR ACTIVITIES (EG, GOING OUT, ACCOMPLISHING WHAT YOU WANT, WORK ACTIVITIES OR YOUR RELATIONSHIPS WITH OTHERS): 0 - NEVER BOTHERED
RATE HOW BOTHERED YOU ARE BY SYMPTOMS OF YOUR SKIN PROBLEM (EG, ITCHING, STINGING BURNING, HURTING OR SKIN IRRITATION): 0 - NEVER BOTHERED
RATE HOW EMOTIONALLY BOTHERED YOU ARE BY YOUR SKIN PROBLEM (FOR EXAMPLE, WORRY, EMBARRASSMENT, FRUSTRATION): 4
WHAT SINGLE SKIN CONDITION LISTED BELOW IS THE PATIENT ANSWERING THE QUALITY-OF-LIFE ASSESSMENT QUESTIONS ABOUT: NONE OF THE ABOVE

## 2025-04-08 ASSESSMENT — PATIENT GLOBAL ASSESSMENT (PGA): WHAT IS THE PGA: PATIENT GLOBAL ASSESSMENT:  2 - MILD

## 2025-04-09 ENCOUNTER — OFFICE VISIT (OUTPATIENT)
Dept: DERMATOLOGY | Facility: CLINIC | Age: 40
End: 2025-04-09
Payer: COMMERCIAL

## 2025-04-09 DIAGNOSIS — L57.8 DIFFUSE PHOTODAMAGE OF SKIN: ICD-10-CM

## 2025-04-09 DIAGNOSIS — L30.9 HAND ECZEMA: ICD-10-CM

## 2025-04-09 DIAGNOSIS — L85.3 XEROSIS CUTIS: ICD-10-CM

## 2025-04-09 DIAGNOSIS — D48.5 NEOPLASM OF UNCERTAIN BEHAVIOR OF SKIN: Primary | ICD-10-CM

## 2025-04-09 DIAGNOSIS — D18.01 HEMANGIOMA OF SKIN: ICD-10-CM

## 2025-04-09 DIAGNOSIS — D22.5 MELANOCYTIC NEVUS OF TRUNK: ICD-10-CM

## 2025-04-09 PROCEDURE — 11301 SHAVE SKIN LESION 0.6-1.0 CM: CPT | Performed by: DERMATOLOGY

## 2025-04-09 PROCEDURE — 99204 OFFICE O/P NEW MOD 45 MIN: CPT | Performed by: DERMATOLOGY

## 2025-04-09 RX ORDER — TRIAMCINOLONE ACETONIDE 1 MG/G
CREAM TOPICAL
Qty: 80 G | Refills: 1 | Status: SHIPPED | OUTPATIENT
Start: 2025-04-09

## 2025-04-09 NOTE — PROGRESS NOTES
Subjective     Beatriz Mandujano is a 39 y.o. female who presents for the following: Skin Check.  She notes a brown spot on her right lower abdomen, which she first noticed 2 months ago and has increased in size, gotten darker in color, and changed shape.  She also notes red, scaly, itchy areas on her hands.  She denies any other new, changing, or concerning skin lesions; no bleeding, itching, or burning lesions.      Review of Systems:  No other skin or systemic complaints other than what is documented elsewhere in the note.    The following portions of the chart were reviewed this encounter and updated as appropriate:       Skin Cancer History  No skin cancer on file.    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    A full examination was performed including scalp, face, eyes, ears, nose, lips, neck, chest, axillae, abdomen, back, bilateral upper extremities, and bilateral lower extremities. All findings within normal limits unless otherwise noted below.    Assessment/Plan   1. Neoplasm of uncertain behavior of skin (3)  Right  Lower Abdomen  5 mm dark brown pigmented, asymmetric papule with an asymmetric pigment network and irregular borders           Shave removal    Lesion length (cm):  0.5  Margin per side (cm):  0.2  Lesion diameter (cm):  0.9  Informed consent: discussed and consent obtained    Timeout: patient name, date of birth, surgical site, and procedure verified    Procedure prep:  Patient was prepped and draped  Anesthesia: the lesion was anesthetized in a standard fashion    Anesthetic:  1% lidocaine w/ epinephrine 1-100,000 local infiltration  Instrument used: flexible razor blade    Hemostasis achieved with: aluminum chloride    Outcome: patient tolerated procedure well    Post-procedure details: sterile dressing applied and wound care instructions given    Dressing type: bandage and petrolatum      Staff Communication: Dermatology Local Anesthesia: 1 % Lidocaine / Epinephrine - Amount:0.5ml    Specimen 1 - Dermatopathology- DERM LAB  Differential Diagnosis: SK v DN  Check Margins Yes/No?:    Comments:    Dermpath Lab: Routine Histopathology (formalin-fixed tissue)    Left Medial Upper Back  3 mm dark brown pigmented, asymmetric macule with an asymmetric pigment network and irregular borders           Shave removal    Lesion length (cm):  0.3  Margin per side (cm):  0.2  Lesion diameter (cm):  0.7  Informed consent: discussed and consent obtained    Timeout: patient name, date of birth, surgical site, and procedure verified    Procedure prep:  Patient was prepped and draped  Anesthesia: the lesion was anesthetized in a standard fashion    Anesthetic:  1% lidocaine w/ epinephrine 1-100,000 local infiltration  Instrument used: flexible razor blade    Hemostasis achieved with: aluminum chloride    Outcome: patient tolerated procedure well    Post-procedure details: sterile dressing applied and wound care instructions given    Dressing type: bandage and petrolatum      Staff Communication: Dermatology Local Anesthesia: 1 %  Lidocaine / Epinephrine - Amount:0.5ml    Specimen 2 - Dermatopathology- DERM LAB  Differential Diagnosis: DN  Check Margins Yes/No?:    Comments:    Dermpath Lab: Routine Histopathology (formalin-fixed tissue)    Left Mid-Back  4 mm dark brown pigmented, asymmetric macule with an asymmetric pigment network and irregular borders           Shave removal    Lesion length (cm):  0.4  Margin per side (cm):  0.2  Lesion diameter (cm):  0.8  Informed consent: discussed and consent obtained    Timeout: patient name, date of birth, surgical site, and procedure verified    Procedure prep:  Patient was prepped and draped  Anesthesia: the lesion was anesthetized in a standard fashion    Anesthetic:  1% lidocaine w/ epinephrine 1-100,000 local infiltration  Instrument used: flexible razor blade    Hemostasis achieved with: aluminum chloride    Outcome: patient tolerated procedure well    Post-procedure details: sterile dressing applied and wound care instructions given    Dressing type: bandage and petrolatum      Staff Communication: Dermatology Local Anesthesia: 1 % Lidocaine / Epinephrine - Amount:0.5ml    Specimen 3 - Dermatopathology- DERM LAB  Differential Diagnosis: DN  Check Margins Yes/No?:    Comments:    Dermpath Lab: Routine Histopathology (formalin-fixed tissue)    2. Melanocytic nevus of trunk  Scattered on the patient's face, neck, trunk, and extremities, there are multiple small, round- to oval-shaped, brown-pigmented and pink-colored, symmetric, uniform-appearing macules and dome-shaped papules    Clinically benign- to slightly atypical-appearing nevi - the clinically benign- to slightly atypical-appearing nature of the remainder of the patient's nevi was discussed with the patient today.  None of the patient's nevi, with the exception of the 3 noted above, meet threshold for biopsy today.  I emphasized the importance of performing monthly self-skin exams using the ABCDs of monitoring moles, which were reviewed  with the patient today and an informational hand-out provided.  I also emphasized the importance of sun avoidance and sun protection with daily sunscreen use.    3. Hemangioma of skin  Scattered on the patient's face, neck, trunk, and extremities, there are multiple small, round, cherry red- to purplish-colored, symmetric, uniform, vascular-appearing macules and papules    Cherry Angiomas - the benign nature of these vascular lesions was discussed with the patient today and reassurance provided.  No treatment is medically indicated for these lesions at this time.    4. Hand eczema  Right Hand - Anterior  On the patient's hands, there are multiple erythematous and hyperpigmented, scaly papules coalescing into several ill-defined, slightly lichenified, thin plaques    Hand Dermatitis -bilateral hands.  The potentially chronic and intermittently flaring nature of this condition and treatment options were discussed extensively with the patient today.  At this time, I recommend topical steroid therapy with Triamcinolone 0.1% cream, which the patient was instructed to apply twice daily to the affected areas of her hands (avoid face, groin, body folds) for the next 2 weeks, followed by taper to twice daily on weekends only for persistent areas; the patient may repeat treatment in a 2-week burst-and-taper fashion every 6-8 weeks as needed for future flares.  I also emphasized the importance of dry, sensitive skin care and good hand hygiene, including minimizing the frequency and duration of hand-washing as much as is safely possible, given the current coronavirus pandemic; using a lukewarm, but not hot water and a mild soap, such as Dove; and frequent and aggressive moisturization, at least twice daily and immediately following hand-washing, with recommended over-the-counter moisturizing creams, such as Eucerin, Cetaphil, Cerave, or Aveeno; Vaseline or Aquaphor ointments; or Neutrogena Norwegian Formula Hand Cream.  The  risks, benefits, and side effects of this medication, including possible skin atrophy with overuse of topical steroids, were discussed.  The patient expressed understanding and is in agreement with this plan.    triamcinolone (Kenalog) 0.1 % cream - Right Hand - Anterior  Apply twice daily to affected areas of hands (avoid face, groin, body folds) for 2 weeks, then taper to twice daily on weekends only; repeat every 6-8 weeks as needed for flares    5. Xerosis cutis  Diffuse generalized dry, scaly skin.    Xerosis.  I emphasized the importance of dry, sensitive skin care, including the use of a mild soap, such as Dove, and frequent and aggressive moisturization, at least twice daily and immediately following showers or baths, with recommended over-the-counter moisturizing creams, such as Eucerin, Cetaphil, Cerave, or Aveeno, or Vaseline or Aquaphor ointments.    6. Diffuse photodamage of skin  Photodistributed  Diffuse photodamage with actinic changes with telangiectasia and mottled pigmentation in sun-exposed areas.    Photodamage.  The signs and symptoms of skin cancer were reviewed and the patient was advised to practice sun protection and sun avoidance, use daily sunscreen, and perform regular self skin exams.  Sun protection was discussed, including avoiding the mid-day sun, wearing a sunscreen with SPF at least 50, and stressing the need for reapplication of sunscreen and applying more than they think they need.

## 2025-04-14 LAB
LAB AP ASR DISCLAIMER: NORMAL
LABORATORY COMMENT REPORT: NORMAL
PATH REPORT.FINAL DX SPEC: NORMAL
PATH REPORT.GROSS SPEC: NORMAL
PATH REPORT.MICROSCOPIC SPEC OTHER STN: NORMAL
PATH REPORT.RELEVANT HX SPEC: NORMAL
PATH REPORT.TOTAL CANCER: NORMAL

## 2025-04-16 NOTE — PROGRESS NOTES
Beatriz Mandujano female   1985 39 y.o.   [id number]      Chief Complaint  High risk surveillance care, annual mammogram and exam    History Of Present Illness  Beatriz Mandujano is a very pleasant 39 year old  woman [facility]. She has family history of breast cancer in her mother, age 49. Her mother had genetic testing in 2022, 77 gene panel, negative. She denies breast surgery or biopsy. She presents today for annual mammogram and exam. She denies any new masses or lumps.      BREAST IMAGIN2024 Bilateral screening mammogram, indicates BI-RADS Category 1.      FEMALE HISTORY: menarche age 14, , first birth age 29,  x 6 years, no OCP's, premenopausal, regular monthly cycles,  had vasectomy, heterogeneously dense tissue     FAMILY CANCER HISTORY:  Mother: Breast cancer, age 49, ILC/LCIS treated with neoadjuvant chemo, bilateral mastectomy and post-operative radiation, negative genetics, also has history of neck cancer, alive and well  Maternal Aunt: Brain cancer, 50s      Surgical History  She has a past surgical history that includes  section, low transverse (10/09/2017); Mouth surgery (10/09/2017); and Skin biopsy (2018).     Social History  She reports that she has never smoked. She has never used smokeless tobacco. She reports current alcohol use. She reports that she does not use drugs.    Family History  Family History   Problem Relation Name Age of Onset    Breast cancer Mother breast cancer 49        sp hereditary testing and neg    Cancer Mother breast cancer     Brain cancer Mother's Sister      Ovarian cancer Father's Sister  60    Cancer Maternal Grandmother skin cancer         Allergies  Penicillins    Medications  Current Outpatient Medications   Medication Instructions    brimonidine 0.33 % gel with pump Topical: Apply a pea-size amount once daily as a thin layer across the entire face covering the  central forehead, each cheek, nose, and chin.    cholecalciferol (Vitamin D-3) 50 MCG (2000 UT) tablet Take by mouth.    triamcinolone (Kenalog) 0.1 % cream Apply twice daily to affected areas of hands (avoid face, groin, body folds) for 2 weeks, then taper to twice daily on weekends only; repeat every 6-8 weeks as needed for flares         REVIEW OF SYSTEMS    Constitutional:  Negative for appetite change, fatigue, fever and unexpected weight change.   HENT:  Negative for ear pain, hearing loss, nosebleeds, sore throat and trouble swallowing.    Eyes:  Negative for discharge, itching and visual disturbance.   Respiratory:  Negative for cough, chest tightness and shortness of breath.    Cardiovascular:  Negative for chest pain, palpitations and leg swelling.   Breast: as indicated in HPI  Gastrointestinal:  Negative for abdominal pain, constipation, diarrhea and nausea.   Endocrine: Negative for cold intolerance and heat intolerance.   Genitourinary:  Negative for dysuria, frequency, hematuria, pelvic pain and vaginal bleeding.   Musculoskeletal:  Negative for arthralgias, back pain, gait problem, joint swelling and myalgias.   Skin:  Negative for color change and rash.   Allergic/Immunologic: Negative for environmental allergies and food allergies.   Neurological:  Negative for dizziness, tremors, speech difficulty, weakness, numbness and headaches.   Hematological:  Does not bruise/bleed easily.   Psychiatric/Behavioral:  Negative for agitation, dysphoric mood and sleep disturbance. The patient is not nervous/anxious.         Past Medical History  She has a past medical history of Acute otitis media (2025), Axillary mass, right (2025), Bacterial vaginosis (2025), Headache (2025), History of  section (2025), Infectious mononucleosis (2009), Mastitis, postpartum (2025), Migraine headache (2007), Migraine without aura, not intractable, without status migrainosus  (02/12/2018), Nausea and vomiting (04/04/2023), Right hip pain (04/29/2025), Shingles (2015), Syncope (04/29/2025), and Vaginal delivery ([facility]) (05/13/2017).     Physical Exam  Patient is alert and oriented x3 and in a relaxed and appropriate mood. Her gait is steady and hand grasps are equal. Sclera is clear. The breasts are full and nearly symmetrical. The tissue is dense centrally and softer around without palpable abnormalities, discrete nodules or masses. The skin and nipples appear normal. There is no cervical, supraclavicular or axillary lymphadenopathy. Heart rate and rhythm normal, S1 and S2 appreciated. The lungs are clear to auscultation bilaterally.     Physical Exam     Last Recorded Vitals  Vitals:    05/05/25 0802   BP: 117/81   Pulse: 64   Temp: 36.7 °C (98 °F)   SpO2: 99%         Relevant Results   Time was spent viewing digital images of the radiology testing with the patient, results pending    Risk Profile      Assessment/Plan   High risk surveillance care, normal clinical exam, screening mammogram, family history of breast cancer, heterogeneously dense tissue     Plan: Return in one year for bilateral screening mammogram and office visit. Fast MRI optional in November 2025, five year risk 0.8%. She will contact office for order if decided. Endocrine therapy not recommended at this time.     Patient Discussion/Summary  Your clinical examination is normal. Please return in one year for bilateral screening mammogram and office visit or sooner if you have any problems or concerns.     High risk breast surveillance care plan:  Yearly mammogram with digital breast tomosynthesis  Twice yearly clinical breast examinations  Breast MRI - Fast MRI optional in November 2025  Monthly self breast examinations  Vitamin D3 2000 IU/daily (over the counter)  Exercise 3-4 times per week for 45-60 minutes  Limit alcohol to 3-4 drinks per week   Eat a healthy low-fat diet with lots of fruits and vegetables    You  can see your health information, review clinical summaries from office visits & test results online when you follow your health with MY  Chart, a personal health record. To sign up go to [email or web address]. If you need assistance with signing up or trouble getting into your account call [employer] Patient Line 24/7 at [phone].    My office phone number is [phone] if you need to get in touch with me or have additional questions or concerns. Thank you for choosing [facility] and trusting me as your healthcare provider. I look forward to seeing you again at your next office visit. I am honored to be a provider on your health care team and I remain dedicated to helping you achieve your health goals.      Violette Styles, APRN-CNP

## 2025-04-17 ENCOUNTER — TELEPHONE (OUTPATIENT)
Dept: DERMATOLOGY | Facility: CLINIC | Age: 40
End: 2025-04-17

## 2025-04-17 ENCOUNTER — TREATMENT (OUTPATIENT)
Dept: PHYSICAL THERAPY | Facility: CLINIC | Age: 40
End: 2025-04-17
Payer: COMMERCIAL

## 2025-04-17 DIAGNOSIS — M62.89 MUSCLE TIGHTNESS: ICD-10-CM

## 2025-04-17 DIAGNOSIS — N39.3 PRIMARY STRESS URINARY INCONTINENCE: ICD-10-CM

## 2025-04-17 DIAGNOSIS — M62.81 MUSCLE WEAKNESS: ICD-10-CM

## 2025-04-17 PROCEDURE — 97110 THERAPEUTIC EXERCISES: CPT | Mod: GP | Performed by: PHYSICAL THERAPIST

## 2025-04-17 NOTE — PROGRESS NOTES
Physical Therapy  Physical Therapy Pelvic Floor    Name: Beatriz Mandujano  MRN: [id number]  : 1985  Encounter Date: 2025  Visit Count: 4     Time Calculation  Start Time: 735  Stop Time: 810  Time Calculation (min): 35 min    Insurance: MMO, no auth  Visit # 4 of 20 For     Current Problem:  1. Primary stress urinary incontinence  Follow Up In Physical Therapy      2. Muscle weakness  Follow Up In Physical Therapy      3. Muscle tightness  Follow Up In Physical Therapy          General     Precautions     Vital Signs     Pain       Subjective  Chief Complaint: stress urinary incontinence with exercises/high level activity - unexpected  - never lost complete control  - 3 children, 9 -7-5 years old  Onset: several years (5 years)  DELORES: unknown    3/13/25:   - fell down stairs, possible mild concussion  - exercises a couple of times since last visit, unsure about contraction of deep core/kegel  - no pain, mild tightness stiffness of neck and shoulders, also could be associated with life stressors    3/27/25:   - no leaking, but hasn't been doing high impact activities recently  Hep compliance: yes    25:   - kept up with kegels the most, other exericses a handful of times  - started peleton programs: body wt & up to 10# wts, no leaking (noticed soreness of lower abdomen after)    PAIN  Intensity (0-10): 0 - lower back soreness with prolonged standing  Location:   Description:     Aggravating Factors: running, jumping, soccer, hitting baseball  Relieving Factors: avoiding activities    Prior Level of Function (PLOF)  Exercise/Physical Activity: running treadmill 2-4x/week, 20-30min, incline walk, peleton charlie (weight workouts)  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 Intermittent  Present with cough and/or sneeze? Sometimes, unexpected  Present with physical activity and/or exertion? Yes  How much urine do you leak? Dribble  Do you wear any barriers, such as pantishields or pads? No  Average Fluid Intake (glasses/day): water, tea, chocolate milk, 6-8 glasses    Objective  Posture: wnl  SL Stance: mild hip  drop  DL Squat: wnl  SL Squat: dkv and att  Standing Lumbar Mobility: wnl  SLR: wnl  Bridge: wnl - hip drop with SL march  Hip PROM: mild tightness IR/ER/flex  MMT: sup hip: 5/5  - Hamstrings: moderate tightness  - Piriformis: moderate tightness  Diaphragmatic Breathing: good  Rib Palpation/Positioning: wnl  Assess Abdomen  Rectus Abdominus: wnl  Obliques: wnl  Diastasis Recti: negative  Transverse Abdominus Contraction: good    Patient was educated on pelvic floor anatomy, function, and dysfunction.   Patient was educated on an intra-vaginal pelvic floor assessment technique and verbalized consent.   The patient is aware they have full autonomy and can stop the assessment at any time.   Patient was offered a chaperone and declined one be present.   Chaperone: Declined    Physical therapist and patient reviewed  Policy [id number] and patient was informed of their right for the opportunity of a chaperone to be present in the room during intimate examinations.  The offer of a chaperone was made to the patient.   Patient was offered the opportunity for either a family/friend or 3rd party chaperone for evaluation/treatment.  Patient declined a chaperone.      Perineal Observation - At Rest: wnl  Contraction: visible (P>A)  Relaxation: visible    Bony Palpation  Pubic symphysis: no tenderness  Ischial Tuberosity: no tenderness  External Soft Tissue Palpation/Pelvic Clock - no tenderness    Internal Intra-vaginal Exam  - Layer 1/2/3: mild global tension throughout layers 1/2/3 - no tenderness reported  - Layer 3 Strength-MMT = 4/5  Reference Values  0 = None  1 =  flicker  2 = contract only  3 = moderate (posterior > anterior)  4 = contract, lift, compression present  5 = strong lift & compression with posterior resistance    Laycock Quantitative Assessment Scale (PERF)  Power = 4/5  Endurance = 5-6/10 (decreased with further repetitions)  Repetitions + 4 sec rest interval = 3    Treatments:   Education: home exercise program, plan of care, activity modifications, pain management, and injury pathology  - Female Sexual Med Department  - Registered Dietician through [employer]  - Differences between Functional, Integrative, Primary Care, and Naturopathic Physicians    Access Code: [email or web address]  ACTIVATION  - Seated Diaphragmatic Breathing  - 1-3 x daily - 3-5 breaths hold  - Supine Pelvic Floor Contraction  - 1-3 x daily - 10 reps - 3-5 seconds hold  - Hooklying Transversus Abdominis Palpation  - 3-5 x weekly - 5 reps - 2 breaths hold    LEVEL I Strength  - Bridge  - 3-5 x weekly - 10 reps  - Supine Bridge with Mini Swiss Ball Between Knees  - 3-5 x weekly - 3 sets - 10 reps  - Bridge with Hip Abduction and Resistance  - 3-5 x weekly - 3 sets - 10 reps  - Supine Hip Adduction Isometric with Ball  - 3-5 x weekly - 3 sets - 10 reps  - Straight Leg Raise  - 3-5 x weekly - 3 sets - 10 reps  - Sidelying Hip Abduction  - 3-5 x weekly - 3 sets - 10 reps  - Beginner Prone Single Leg Raise  - 3-5 x weekly - 3 sets - 10 reps    LEVEL II Strength  - Supine Transversus Abdominis Bracing with Leg Extension  - 3-5 x weekly - 10 reps  - Supine 90/90 Abdominal Bracing  - 3-5 x weekly - 10 reps  - Supine 90/90 Alternating Toe Touch  - 3-5 x weekly - 1-3 sets - 10 reps  - Supine 90/90 leg extensions  - 3-5 x weekly - 1-3 sets - 10 reps  - Ab Prep  - 3-5 x weekly - 3 sets - 10 reps  - Diagonal Curl Up with Reach  - 3-5 x weekly - 3 sets - 10 reps  - Prone Alternating Arm and Leg Lifts  - 3-5 x weekly - 1-3 sets - 10 reps  - Prone Bilateral Arm and Leg Lift  - 3-5 x weekly - 1-3 sets - 10  reps    Tx Codes:    There Ex x3    Plan for Next Visit: assess response to hep  - core/hip/PF strength & endurance progression  - open to intravaginal assessment next visit, chaperone discussed: declines    Assessment: Patient presents with signs and symptoms consistent with stress urinary incontinence, resulting in limited participation in pain-free ADLs and inability to perform at their prior level of function. Pt would benefit from physical therapy to address the impairments found & listed previously in the objective section in order to return to safe and pain-free ADLs and prior level of function.   PT Assessment Results: muscle weakness, muscle tightness   Rehab Prognosis: good   Clinical Presentation: stable  - intravaginal pelvic floor assessment performed today and revealed pelvic floor muscle weakness (4/5) and lacking endurance (5-6 sec hold)  - improved deep core activation and breathing with exercises, tolerated progression well    Plan:   Planned Interventions include: therapeutic exercise, self-care home management, manual therapy, therapeutic activities, gait training, neuromuscular coordination, aquatic therapy  Patient and/or family understands and agrees with goals and plan documented: yes  Frequency: 2x/month  Duration: 2-4 months     Suzie Jackson, PT

## 2025-04-29 ENCOUNTER — APPOINTMENT (OUTPATIENT)
Dept: PRIMARY CARE | Facility: CLINIC | Age: 40
End: 2025-04-29
Payer: COMMERCIAL

## 2025-04-29 VITALS
SYSTOLIC BLOOD PRESSURE: 117 MMHG | HEIGHT: 61 IN | HEART RATE: 76 BPM | OXYGEN SATURATION: 97 % | DIASTOLIC BLOOD PRESSURE: 83 MMHG | WEIGHT: 171 LBS | BODY MASS INDEX: 32.28 KG/M2

## 2025-04-29 DIAGNOSIS — E66.1 CLASS 1 DRUG-INDUCED OBESITY WITHOUT SERIOUS COMORBIDITY WITH BODY MASS INDEX (BMI) OF 32.0 TO 32.9 IN ADULT: ICD-10-CM

## 2025-04-29 DIAGNOSIS — M21.611 BUNION OF GREAT TOE OF RIGHT FOOT: ICD-10-CM

## 2025-04-29 DIAGNOSIS — E66.811 CLASS 1 DRUG-INDUCED OBESITY WITHOUT SERIOUS COMORBIDITY WITH BODY MASS INDEX (BMI) OF 32.0 TO 32.9 IN ADULT: ICD-10-CM

## 2025-04-29 DIAGNOSIS — D48.5 NEOPLASM OF UNCERTAIN BEHAVIOR OF SKIN: ICD-10-CM

## 2025-04-29 DIAGNOSIS — Z00.00 HEALTHCARE MAINTENANCE: Primary | ICD-10-CM

## 2025-04-29 DIAGNOSIS — L71.9 ROSACEA: ICD-10-CM

## 2025-04-29 DIAGNOSIS — N39.3 PRIMARY STRESS URINARY INCONTINENCE: ICD-10-CM

## 2025-04-29 DIAGNOSIS — E55.9 VITAMIN D DEFICIENCY: ICD-10-CM

## 2025-04-29 PROBLEM — B96.89 BACTERIAL VAGINOSIS: Status: RESOLVED | Noted: 2025-04-29 | Resolved: 2025-04-29

## 2025-04-29 PROBLEM — Z98.891 HISTORY OF CESAREAN SECTION: Status: RESOLVED | Noted: 2025-04-29 | Resolved: 2025-04-29

## 2025-04-29 PROBLEM — M71.20 BAKER'S CYST OF KNEE: Status: ACTIVE | Noted: 2025-04-29

## 2025-04-29 PROBLEM — M25.551 RIGHT HIP PAIN: Status: RESOLVED | Noted: 2025-04-29 | Resolved: 2025-04-29

## 2025-04-29 PROBLEM — R51.9 HEADACHE: Status: RESOLVED | Noted: 2025-04-29 | Resolved: 2025-04-29

## 2025-04-29 PROBLEM — H66.90 ACUTE OTITIS MEDIA: Status: RESOLVED | Noted: 2025-04-29 | Resolved: 2025-04-29

## 2025-04-29 PROBLEM — D22.5 MELANOCYTIC NEVI OF TRUNK: Status: ACTIVE | Noted: 2018-03-23

## 2025-04-29 PROBLEM — L30.1 DYSHIDROTIC ECZEMA: Status: ACTIVE | Noted: 2025-04-29

## 2025-04-29 PROBLEM — R22.31 AXILLARY MASS, RIGHT: Status: RESOLVED | Noted: 2025-04-29 | Resolved: 2025-04-29

## 2025-04-29 PROBLEM — N76.0 BACTERIAL VAGINOSIS: Status: RESOLVED | Noted: 2025-04-29 | Resolved: 2025-04-29

## 2025-04-29 PROBLEM — R55 SYNCOPE: Status: RESOLVED | Noted: 2025-04-29 | Resolved: 2025-04-29

## 2025-04-29 PROBLEM — L72.3 SEBACEOUS CYST OF RIGHT AXILLA: Status: ACTIVE | Noted: 2018-03-23

## 2025-04-29 PROBLEM — R11.2 NAUSEA AND VOMITING: Status: RESOLVED | Noted: 2023-04-04 | Resolved: 2025-04-29

## 2025-04-29 PROBLEM — L57.9 SKIN CHANGES DUE TO CHRONIC EXPOSURE TO NONIONIZING RADIATION, UNSPECIFIED: Status: ACTIVE | Noted: 2018-03-23

## 2025-04-29 PROBLEM — M25.559 HIP PAIN: Status: ACTIVE | Noted: 2025-04-29

## 2025-04-29 PROBLEM — O91.23 MASTITIS ASSOCIATED WITH LACTATION: Status: RESOLVED | Noted: 2025-04-29 | Resolved: 2025-04-29

## 2025-04-29 PROCEDURE — 99385 PREV VISIT NEW AGE 18-39: CPT

## 2025-04-29 PROCEDURE — 3008F BODY MASS INDEX DOCD: CPT

## 2025-04-29 PROCEDURE — 1036F TOBACCO NON-USER: CPT

## 2025-04-29 RX ORDER — BRIMONIDINE 5 MG/G
GEL TOPICAL
Qty: 30 G | Refills: 0 | Status: SHIPPED | OUTPATIENT
Start: 2025-04-29

## 2025-04-29 ASSESSMENT — PATIENT HEALTH QUESTIONNAIRE - PHQ9
1. LITTLE INTEREST OR PLEASURE IN DOING THINGS: NOT AT ALL
1. LITTLE INTEREST OR PLEASURE IN DOING THINGS: NOT AT ALL
SUM OF ALL RESPONSES TO PHQ9 QUESTIONS 1 AND 2: 0
SUM OF ALL RESPONSES TO PHQ9 QUESTIONS 1 AND 2: 0
2. FEELING DOWN, DEPRESSED OR HOPELESS: NOT AT ALL
2. FEELING DOWN, DEPRESSED OR HOPELESS: NOT AT ALL

## 2025-04-29 ASSESSMENT — ENCOUNTER SYMPTOMS
LOSS OF SENSATION IN FEET: 0
DEPRESSION: 0
OCCASIONAL FEELINGS OF UNSTEADINESS: 0

## 2025-04-29 NOTE — PROGRESS NOTES
"Reason for Visit: Annual Physical Exam    HPI:  HPI  NPV/ est care  Health Maintenance    Primary stress urinary incontinence   Currently doing pelvic floor PT; has 3 children    Vitamin D deficiency     Class 1 obesity  4/29/2025: BMI 32.31, weight 171lbs  She feels like she is struggling with weight loss    Right great toe- electric shocks;     HM:  Exercise: biking and weight training- started 2 weeks ago  Diet: no meat; well-balanced  Immunizations: UTD;   TDAP: UTD;  Influenza vaccine: UTD  Eye Examinations: DUE  Dental Examinations: DUE  Skin checks: dermatology referral  Mammogram: Fam Hx breast Cancer- Mother; following with [facility]- next mammogram scheduled for 5/5/2025  PAP test: last 6/19/2023- was following with Dr. Sandra Whelan  Bone Density: N/A  Colonoscopy: due at age 47 unless otherwise indicated  Lung cancer screening: N/A  Hgb A1c:   Lab Results   Component Value Date    HGBA1C 4.9 03/18/2022     Cholesterol panel:   Lab Results   Component Value Date    CHOL 214 (H) 03/18/2022    CHOL 201 (H) 02/26/2021    CHOL 243 (H) 09/25/2019     Lab Results   Component Value Date    HDL 72.2 03/18/2022    HDL 66.6 02/26/2021    HDL 80.4 09/25/2019      No results found for: \"LDLCALC\"  Lab Results   Component Value Date    TRIG 63 03/18/2022    TRIG 71 02/26/2021    TRIG 76 09/25/2019     No components found for: \"CHOLHDL\"        Active Problem List  Problem List[1]    Comprehensive Medical/Surgical/Social/Family History  Medical History[2]  Surgical History[3]  Social History     Social History Narrative    Not on file         Allergies and Medications  Penicillins  Medications Ordered Prior to Encounter[4]      ROS otherwise negative aside from what was mentioned above in HPI.  Review of Systems      Vitals  /83   Pulse 76   Ht 1.549 m (5' 1\")   Wt 77.6 kg (171 lb)   SpO2 97%   BMI 32.31 kg/m²   Body mass index is 32.31 kg/m².    Physical Exam  Physical Exam  Vitals reviewed. "   Constitutional:       General: She is not in acute distress.     Appearance: Normal appearance. She is normal weight. She is not ill-appearing, toxic-appearing or diaphoretic.   HENT:      Head: Normocephalic and atraumatic.      Right Ear: Tympanic membrane, ear canal and external ear normal. There is no impacted cerumen.      Left Ear: Tympanic membrane, ear canal and external ear normal. There is no impacted cerumen.      Nose: Nose normal.   Eyes:      Conjunctiva/sclera: Conjunctivae normal.   Cardiovascular:      Rate and Rhythm: Normal rate and regular rhythm.      Pulses: Normal pulses.      Heart sounds: Normal heart sounds. No murmur heard.     No friction rub. No gallop.   Pulmonary:      Effort: Pulmonary effort is normal. No respiratory distress.      Breath sounds: Normal breath sounds.   Abdominal:      General: Abdomen is flat. Bowel sounds are normal.      Palpations: Abdomen is soft.   Musculoskeletal:         General: Normal range of motion.      Cervical back: Normal range of motion and neck supple.   Lymphadenopathy:      Cervical: No cervical adenopathy.   Skin:     General: Skin is warm and dry.      Capillary Refill: Capillary refill takes less than 2 seconds.   Neurological:      General: No focal deficit present.      Mental Status: She is alert and oriented to person, place, and time. Mental status is at baseline.   Psychiatric:         Mood and Affect: Mood normal.         Behavior: Behavior normal.         Thought Content: Thought content normal.         Judgment: Judgment normal.           Assessment and Plan:  Problem List Items Addressed This Visit           ICD-10-CM    Vitamin D deficiency E55.9    Relevant Orders    Vitamin D 25-Hydroxy,Total (for eval of Vitamin D levels)    Rosacea L71.9    Relevant Medications    brimonidine 0.33 % gel with pump    Neoplasm of uncertain behavior of skin D48.5    Relevant Orders    Referral to Dermatology    Primary stress urinary  incontinence  Persisting  C/w pelvic floor PT N39.3    Healthcare maintenance - Primary Z00.00    Relevant Orders    CBC and Auto Differential    Comprehensive metabolic panel    Lipid Panel    Hemoglobin A1C    Referral to Obstetrics / Gynecology    Class 1 drug-induced obesity without serious comorbidity with body mass index (BMI) of 32.0 to 32.9 in adult E66.811, E66.1, Z68.32    Persisting  Class 1 obesity  4/29/2025: BMI 32.31, weight 171lbs  She feels like she is struggling with weight loss  Relevant Orders    Referral to Nutrition Services    TSH with reflex to Free T4 if abnormal  Lifestyle changes    Bunion of great toe of right foot  Persisting  Work on wearing supportive shoes; consider podiatry M21.611       NPV/ est care  Health Maintenance      HM:  Exercise: biking and weight training- started 2 weeks ago  Diet: no meat; well-balanced  Immunizations: UTD;   TDAP: UTD;  Influenza vaccine: UTD  Eye Examinations: DUE  Dental Examinations: DUE  Skin checks: dermatology referral  Mammogram: Fam Hx breast Cancer- Mother; following with [facility]- next mammogram scheduled for 5/5/2025  PAP test: last 6/19/2023- was following with Dr. Sandra Whelan  Bone Density: N/A  Colonoscopy: due at age 47 unless otherwise indicated  Lung cancer screening: N/A  Encourage diet and exercise to maintain healthy lifestyle.     Follow up with annual wellness exam and as needed    [facility] Tsang-CNP       [1]   Patient Active Problem List  Diagnosis    Vitamin D deficiency    Skin changes due to chronic exposure to nonionizing radiation, unspecified    Sebaceous cyst of right axilla    Rosacea    Hip pain    Neoplasm of uncertain behavior of skin    Melanocytic nevi of trunk    Dyshidrotic eczema    Altman's cyst of knee    Primary stress urinary incontinence    Healthcare maintenance    Class 1 drug-induced obesity without serious comorbidity with body mass index (BMI) of 32.0 to 32.9 in adult    Bunion of  great toe of right foot   [2]   Past Medical History:  Diagnosis Date    Acute otitis media 2025    Axillary mass, right 2025    Bacterial vaginosis 2025    Headache 2025    History of  section 2025    Infectious mononucleosis 2009    Mastitis, postpartum 2025    Migraine headache 2007    Migraine without aura, not intractable, without status migrainosus 2018    Common migraine without aura    Nausea and vomiting 2023    Right hip pain 2025    Syncope 2025    [facility] delivery ([facility] 2017   [3]   Past Surgical History:  Procedure Laterality Date     SECTION, LOW TRANSVERSE  10/09/2017     Section Low Transverse    MOUTH SURGERY  10/09/2017    Oral Surgery Tooth Extraction [place] [facility]   [4]   Current Outpatient Medications on File Prior to Visit   Medication Sig Dispense Refill    cholecalciferol (Vitamin D-3) 50 MCG (2000 UT) tablet Take by mouth.      triamcinolone (Kenalog) 0.1 % cream Apply twice daily to affected areas of hands (avoid face, groin, body folds) for 2 weeks, then taper to twice daily on weekends only; repeat every 6-8 weeks as needed for flares (Patient not taking: Reported on 2025) 80 g 1     No current facility-administered medications on file prior to visit.

## 2025-04-30 LAB
25(OH)D3+25(OH)D2 SERPL-MCNC: 23 NG/ML (ref 30–100)
ALBUMIN SERPL-MCNC: 4.4 G/DL (ref 3.6–5.1)
ALP SERPL-CCNC: 44 U/L (ref 31–125)
ALT SERPL-CCNC: 22 U/L (ref 6–29)
ANION GAP SERPL CALCULATED.4IONS-SCNC: 8 MMOL/L (CALC) (ref 7–17)
AST SERPL-CCNC: 23 U/L (ref 10–30)
BASOPHILS # BLD AUTO: 52 CELLS/UL (ref 0–200)
BASOPHILS NFR BLD AUTO: 1.1 %
BILIRUB SERPL-MCNC: 0.6 MG/DL (ref 0.2–1.2)
BUN SERPL-MCNC: 9 MG/DL (ref 7–25)
CALCIUM SERPL-MCNC: 8.9 MG/DL (ref 8.6–10.2)
CHLORIDE SERPL-SCNC: 106 MMOL/L (ref 98–110)
CHOLEST SERPL-MCNC: 220 MG/DL
CHOLEST/HDLC SERPL: 3.3 (CALC)
CO2 SERPL-SCNC: 26 MMOL/L (ref 20–32)
CREAT SERPL-MCNC: 0.68 MG/DL (ref 0.5–0.97)
EGFRCR SERPLBLD CKD-EPI 2021: 115 ML/MIN/1.73M2
EOSINOPHIL # BLD AUTO: 127 CELLS/UL (ref 15–500)
EOSINOPHIL NFR BLD AUTO: 2.7 %
ERYTHROCYTE [DISTWIDTH] IN BLOOD BY AUTOMATED COUNT: 12.7 % (ref 11–15)
EST. AVERAGE GLUCOSE BLD GHB EST-MCNC: 108 MG/DL
EST. AVERAGE GLUCOSE BLD GHB EST-SCNC: 6 MMOL/L
GLUCOSE SERPL-MCNC: 89 MG/DL (ref 65–99)
HBA1C MFR BLD: 5.4 %
HCT VFR BLD AUTO: 44.4 % (ref 35–45)
HDLC SERPL-MCNC: 67 MG/DL
HGB BLD-MCNC: 14.5 G/DL (ref 11.7–15.5)
LDLC SERPL CALC-MCNC: 134 MG/DL (CALC)
LYMPHOCYTES # BLD AUTO: 1452 CELLS/UL (ref 850–3900)
LYMPHOCYTES NFR BLD AUTO: 30.9 %
MCH RBC QN AUTO: 28.7 PG (ref 27–33)
MCHC RBC AUTO-ENTMCNC: 32.7 G/DL (ref 32–36)
MCV RBC AUTO: 87.9 FL (ref 80–100)
MONOCYTES # BLD AUTO: 320 CELLS/UL (ref 200–950)
MONOCYTES NFR BLD AUTO: 6.8 %
NEUTROPHILS # BLD AUTO: 2750 CELLS/UL (ref 1500–7800)
NEUTROPHILS NFR BLD AUTO: 58.5 %
NONHDLC SERPL-MCNC: 153 MG/DL (CALC)
PLATELET # BLD AUTO: 280 THOUSAND/UL (ref 140–400)
PMV BLD REES-ECKER: 9.1 FL (ref 7.5–12.5)
POTASSIUM SERPL-SCNC: 4.3 MMOL/L (ref 3.5–5.3)
PROT SERPL-MCNC: 6.7 G/DL (ref 6.1–8.1)
RBC # BLD AUTO: 5.05 MILLION/UL (ref 3.8–5.1)
SODIUM SERPL-SCNC: 140 MMOL/L (ref 135–146)
TRIGL SERPL-MCNC: 87 MG/DL
TSH SERPL-ACNC: 2.06 MIU/L
WBC # BLD AUTO: 4.7 THOUSAND/UL (ref 3.8–10.8)

## 2025-05-01 ENCOUNTER — TREATMENT (OUTPATIENT)
Dept: PHYSICAL THERAPY | Facility: CLINIC | Age: 40
End: 2025-05-01
Payer: COMMERCIAL

## 2025-05-01 ENCOUNTER — PATIENT MESSAGE (OUTPATIENT)
Dept: PRIMARY CARE | Facility: CLINIC | Age: 40
End: 2025-05-01

## 2025-05-01 DIAGNOSIS — M62.89 MUSCLE TIGHTNESS: ICD-10-CM

## 2025-05-01 DIAGNOSIS — M62.81 MUSCLE WEAKNESS: ICD-10-CM

## 2025-05-01 DIAGNOSIS — N39.3 PRIMARY STRESS URINARY INCONTINENCE: ICD-10-CM

## 2025-05-01 PROCEDURE — 97535 SELF CARE MNGMENT TRAINING: CPT | Mod: GP | Performed by: PHYSICAL THERAPIST

## 2025-05-01 NOTE — PROGRESS NOTES
Physical Therapy  Physical Therapy Pelvic Floor    Name: Beatriz Mandujano  MRN: [id number]  : 1985  Encounter Date: 2025  Visit Count: 5     Time Calculation  Start Time: 735  Stop Time: 805  Time Calculation (min): 30 min    Insurance: MMO, no auth  Visit # 5 of 20 For     Current Problem:  1. Primary stress urinary incontinence  Follow Up In Physical Therapy      2. Muscle weakness  Follow Up In Physical Therapy      3. Muscle tightness  Follow Up In Physical Therapy          General     Precautions     Vital Signs     Pain       Subjective  Chief Complaint: stress urinary incontinence with exercises/high level activity - unexpected  - never lost complete control  - 3 children, 9 -7-5 years old  Onset: several years (7 years)  DELORES: unknown    3/13/25:   - fell down stairs, possible mild concussion  - exercises a couple of times since last visit, unsure about contraction of deep core/kegel  - no pain, mild tightness stiffness of neck and shoulders, also could be associated with life stressors    3/27/25:   - no leaking, but hasn't been doing high impact activities recently  Hep compliance: yes    25:   - kept up with kegels the most, other exericses a handful of times  - started peleton programs: body wt & up to 10# wts, no leaking (noticed soreness of lower abdomen after)    25:   - kegels at least 1x/day  - using some core exercises in her peloton charlie  - hx: 202: fracture R big toe, was in a boot for 5 weeks  - jogging with dog (10-10:30 pace)    PAIN  Intensity (0-10): 0 - lower back soreness with prolonged standing  Location:   Description:     Aggravating Factors: running, jumping, soccer, hitting baseball  Relieving Factors: avoiding activities    Prior Level of Function (PLOF)  Exercise/Physical Activity: running treadmill 2-4x/week, 20-30min, incline walk, peleton charlie (weight workouts)  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 Intermittent  Present with cough and/or sneeze? Sometimes, unexpected  Present with physical activity and/or exertion? Yes  How much urine do you leak? Dribble  Do you wear any barriers, such as pantishields or pads? No  Average Fluid Intake (glasses/day): water, tea, chocolate milk, 6-8 glasses    Objective  Posture: wnl  SL Stance: mild hip  drop  DL Squat: wnl  SL Squat: dkv and att  Standing Lumbar Mobility: wnl  SLR: wnl  Bridge: wnl - hip drop with SL march  Hip PROM: mild tightness IR/ER/flex  MMT: sup hip: 5/5  - Hamstrings: moderate tightness  - Piriformis: moderate tightness  Diaphragmatic Breathing: good  Rib Palpation/Positioning: wnl  Assess Abdomen  Rectus Abdominus: wnl  Obliques: wnl  Diastasis Recti: negative  Transverse Abdominus Contraction: good    Patient was educated on pelvic floor anatomy, function, and dysfunction.   Patient was educated on an intra-vaginal pelvic floor assessment technique and verbalized consent.   The patient is aware they have full autonomy and can stop the assessment at any time.   Patient was offered a chaperone and declined one be present.   Chaperone: Declined    Physical therapist and patient reviewed  Policy [id number] and patient was informed of their right for the opportunity of a chaperone to be present in the room during intimate examinations.  The offer of a chaperone was made to the patient.   Patient was offered the opportunity for either a family/friend or 3rd party chaperone for evaluation/treatment.  Patient declined a chaperone.      Perineal Observation - At Rest: wnl  Contraction: visible (P>A)  Relaxation: visible    Bony Palpation  Pubic symphysis: no tenderness  Ischial Tuberosity: no tenderness  External Soft Tissue Palpation/Pelvic Clock - no  tenderness    Internal Intra-vaginal Exam  - Layer 1/2/3: mild global tension throughout layers 1/2/3 - no tenderness reported  - Layer 3 Strength-MMT = 4/5  Reference Values  0 = None  1 = flicker  2 = contract only  3 = moderate (posterior > anterior)  4 = contract, lift, compression present  5 = strong lift & compression with posterior resistance    Laycock Quantitative Assessment Scale (PERF)  Power = 4/5  Endurance = 5-6/10 (decreased with further repetitions)  Repetitions + 4 sec rest interval = 3    Treatments:   Education: home exercise program, plan of care, activity modifications, pain management, and injury pathology  - Female Sexual Med Department  - Registered Dietician through [employer]  - Differences between Functional, Integrative, Primary Care, and Naturopathic Physicians    Access Code: [email or web address]  ACTIVATION  - Seated Diaphragmatic Breathing  - 1-3 x daily - 3-5 breaths hold  - Supine Pelvic Floor Contraction  - 1-3 x daily - 10 reps - 3-5 seconds hold  - Hooklying Transversus Abdominis Palpation  - 3-5 x weekly - 5 reps - 2 breaths hold    LEVEL I Strength  - Bridge  - 3-5 x weekly - 10 reps  - Supine Bridge with Mini Swiss Ball Between Knees  - 3-5 x weekly - 3 sets - 10 reps  - Bridge with Hip Abduction and Resistance  - 3-5 x weekly - 3 sets - 10 reps  - Supine Hip Adduction Isometric with Ball  - 3-5 x weekly - 3 sets - 10 reps  - Straight Leg Raise  - 3-5 x weekly - 3 sets - 10 reps  - Sidelying Hip Abduction  - 3-5 x weekly - 3 sets - 10 reps  - Beginner Prone Single Leg Raise  - 3-5 x weekly - 3 sets - 10 reps    LEVEL II Strength - continued exercises (add SL mini-squats, toe spacers, big toe flex/ext)  - Supine Transversus Abdominis Bracing with Leg Extension  - 3-5 x weekly - 10 reps  - Supine 90/90 Abdominal Bracing  - 3-5 x weekly - 10 reps  - Supine 90/90 Alternating Toe Touch  - 3-5 x weekly - 1-3 sets - 10 reps  - Supine 90/90 leg extensions  - 3-5 x weekly - 1-3 sets - 10  reps  - Ab Prep  - 3-5 x weekly - 3 sets - 10 reps  - Diagonal Curl Up with Reach  - 3-5 x weekly - 3 sets - 10 reps  - Prone Alternating Arm and Leg Lifts  - 3-5 x weekly - 1-3 sets - 10 reps  - Prone Bilateral Arm and Leg Lift  - 3-5 x weekly - 1-3 sets - 10 reps    Tx Codes:    There Ex x3    Plan for Next Visit: assess response to hep  - core/hip/PF strength & endurance progression  - open to intravaginal assessment next visit, chaperone discussed: declines    Assessment: Patient presents with signs and symptoms consistent with stress urinary incontinence, resulting in limited participation in pain-free ADLs and inability to perform at their prior level of function. Pt would benefit from physical therapy to address the impairments found & listed previously in the objective section in order to return to safe and pain-free ADLs and prior level of function.   PT Assessment Results: muscle weakness, muscle tightness   Rehab Prognosis: good   Clinical Presentation: stable  - intravaginal pelvic floor assessment performed today and revealed pelvic floor muscle weakness (4/5) and lacking endurance (5-6 sec hold)  - improved deep core activation and breathing with exercises, tolerated progression well    Plan:   Planned Interventions include: therapeutic exercise, self-care home management, manual therapy, therapeutic activities, gait training, neuromuscular coordination, aquatic therapy  Patient and/or family understands and agrees with goals and plan documented: yes  Frequency: 2x/month  Duration: 2-4 months     Suzie Jackson, PT

## 2025-05-05 ENCOUNTER — HOSPITAL ENCOUNTER (OUTPATIENT)
Dept: RADIOLOGY | Facility: CLINIC | Age: 40
Discharge: HOME | End: 2025-05-05
Payer: COMMERCIAL

## 2025-05-05 ENCOUNTER — OFFICE VISIT (OUTPATIENT)
Dept: SURGICAL ONCOLOGY | Facility: CLINIC | Age: 40
End: 2025-05-05
Payer: COMMERCIAL

## 2025-05-05 VITALS
SYSTOLIC BLOOD PRESSURE: 117 MMHG | OXYGEN SATURATION: 99 % | TEMPERATURE: 98 F | WEIGHT: 170.09 LBS | HEART RATE: 64 BPM | DIASTOLIC BLOOD PRESSURE: 81 MMHG | BODY MASS INDEX: 32.15 KG/M2

## 2025-05-05 VITALS — BODY MASS INDEX: 32.22 KG/M2 | WEIGHT: 170.64 LBS | HEIGHT: 61 IN

## 2025-05-05 DIAGNOSIS — Z12.39 BREAST CANCER SCREENING, HIGH RISK PATIENT: ICD-10-CM

## 2025-05-05 PROCEDURE — 99213 OFFICE O/P EST LOW 20 MIN: CPT | Performed by: NURSE PRACTITIONER

## 2025-05-05 PROCEDURE — 77067 SCR MAMMO BI INCL CAD: CPT | Performed by: STUDENT IN AN ORGANIZED HEALTH CARE EDUCATION/TRAINING PROGRAM

## 2025-05-05 PROCEDURE — 77067 SCR MAMMO BI INCL CAD: CPT

## 2025-05-05 PROCEDURE — 1036F TOBACCO NON-USER: CPT | Performed by: NURSE PRACTITIONER

## 2025-05-05 PROCEDURE — 77063 BREAST TOMOSYNTHESIS BI: CPT | Performed by: STUDENT IN AN ORGANIZED HEALTH CARE EDUCATION/TRAINING PROGRAM

## 2025-05-05 ASSESSMENT — PAIN SCALES - GENERAL: PAINLEVEL_OUTOF10: 0-NO PAIN

## 2025-05-05 NOTE — PATIENT INSTRUCTIONS
Your clinical examination is normal. Please return in one year for bilateral screening mammogram and office visit or sooner if you have any problems or concerns.     High risk breast surveillance care plan:  Yearly mammogram with digital breast tomosynthesis  Twice yearly clinical breast examinations  Breast MRI - Fast MRI optional in November 2025  Monthly self breast examinations  Vitamin D3 2000 IU/daily (over the counter)  Exercise 3-4 times per week for 45-60 minutes  Limit alcohol to 3-4 drinks per week   Eat a healthy low-fat diet with lots of fruits and vegetables    You can see your health information, review clinical summaries from office visits & test results online when you follow your health with MY  Chart, a personal health record. To sign up go to [email or web address]. If you need assistance with signing up or trouble getting into your account call [employer] Patient Line 24/7 at [phone].    My office phone number is [phone] if you need to get in touch with me or have additional questions or concerns. Thank you for choosing [facility] and trusting me as your healthcare provider. I look forward to seeing you again at your next office visit. I am honored to be a provider on your health care team and I remain dedicated to helping you achieve your health goals.

## 2025-05-07 DIAGNOSIS — R92.8 ABNORMAL MAMMOGRAM OF LEFT BREAST: Primary | ICD-10-CM

## 2025-05-08 ENCOUNTER — HOSPITAL ENCOUNTER (OUTPATIENT)
Dept: RADIOLOGY | Facility: CLINIC | Age: 40
Discharge: HOME | End: 2025-05-08
Payer: COMMERCIAL

## 2025-05-08 DIAGNOSIS — R92.8 ABNORMAL MAMMOGRAM OF LEFT BREAST: ICD-10-CM

## 2025-05-08 PROCEDURE — 77065 DX MAMMO INCL CAD UNI: CPT | Mod: LT

## 2025-05-28 ENCOUNTER — OFFICE VISIT (OUTPATIENT)
Dept: OBSTETRICS AND GYNECOLOGY | Facility: CLINIC | Age: 40
End: 2025-05-28
Payer: COMMERCIAL

## 2025-05-28 VITALS
BODY MASS INDEX: 31.45 KG/M2 | HEART RATE: 84 BPM | DIASTOLIC BLOOD PRESSURE: 79 MMHG | WEIGHT: 166.6 LBS | SYSTOLIC BLOOD PRESSURE: 115 MMHG | HEIGHT: 61 IN

## 2025-05-28 DIAGNOSIS — Z01.419 WELL WOMAN EXAM WITH ROUTINE GYNECOLOGICAL EXAM: Primary | ICD-10-CM

## 2025-05-28 PROCEDURE — 99395 PREV VISIT EST AGE 18-39: CPT | Performed by: OBSTETRICS & GYNECOLOGY

## 2025-05-28 PROCEDURE — 1036F TOBACCO NON-USER: CPT | Performed by: OBSTETRICS & GYNECOLOGY

## 2025-05-28 PROCEDURE — 3008F BODY MASS INDEX DOCD: CPT | Performed by: OBSTETRICS & GYNECOLOGY

## 2025-05-28 ASSESSMENT — ENCOUNTER SYMPTOMS
MUSCULOSKELETAL NEGATIVE: 0
CARDIOVASCULAR NEGATIVE: 0
HEMATOLOGIC/LYMPHATIC NEGATIVE: 0
ENDOCRINE NEGATIVE: 0
PSYCHIATRIC NEGATIVE: 0
GASTROINTESTINAL NEGATIVE: 0
RESPIRATORY NEGATIVE: 0
EYES NEGATIVE: 0
CONSTITUTIONAL NEGATIVE: 0
NEUROLOGICAL NEGATIVE: 0
ALLERGIC/IMMUNOLOGIC NEGATIVE: 0

## 2025-05-28 ASSESSMENT — PATIENT HEALTH QUESTIONNAIRE - PHQ9
2. FEELING DOWN, DEPRESSED OR HOPELESS: NOT AT ALL
SUM OF ALL RESPONSES TO PHQ9 QUESTIONS 1 AND 2: 0
1. LITTLE INTEREST OR PLEASURE IN DOING THINGS: NOT AT ALL

## 2025-05-28 ASSESSMENT — COLUMBIA-SUICIDE SEVERITY RATING SCALE - C-SSRS
6. HAVE YOU EVER DONE ANYTHING, STARTED TO DO ANYTHING, OR PREPARED TO DO ANYTHING TO END YOUR LIFE?: NO
2. HAVE YOU ACTUALLY HAD ANY THOUGHTS OF KILLING YOURSELF?: NO
1. IN THE PAST MONTH, HAVE YOU WISHED YOU WERE DEAD OR WISHED YOU COULD GO TO SLEEP AND NOT WAKE UP?: NO

## 2025-05-28 ASSESSMENT — PAIN SCALES - GENERAL: PAINLEVEL_OUTOF10: 0-NO PAIN

## 2025-05-28 NOTE — PROGRESS NOTES
"Assessment   PLAN  Thank you for coming to your annual exam. We discussed healthy lifestyle, well woman screening, and other diagnoses listed below.    There are no diagnoses linked to this encounter.    Please return for your next visit in 1 year.    Sandra Whelan MD    Subjective     Beatriz Mandujano is a 39 y.o. female who is here for a routine exam.   PCP = Farhana Han, APRN-CNP  Goes by \"Karen\"    APE Concerns: none    Other Concerns:   Discussed weight gain over the past few years. Pt aware there have been lifestyle factors, like not exercising as much and eating more poorly bc life is so hectic caring for the kids. Do not recommend hormone testing - unlikely to be of benefit. She can consider doing things like tracking her sleep quality on a smart watch or even utilizing a self-pay CGM if she wants to know what foods affect her blood glucose poorly. Pt already has referral to Nutrition. Emphasized importance of building in time for herself during the week.   Doing Pelvic PT for JEANMARIE    OB History    Para Term  AB Living   4 3 3  1 3   SAB IAB Ectopic Multiple Live Births   1    3      # Outcome Date GA Lbr Kelvin/2nd Weight Sex Type Anes PTL Lv   4 Term 19 40w0d  3.09 kg F    DANIELA   3 Term 17 39w6d / 01:16 3.345 kg F Vag-Spont EPI N DANIELA   2 SAB 2016 6w0d          1 Term 08/10/15 39w5d  3.204 kg M CS-LTranv Spinal N DANIELA      [facility]     GynHx:  Menarche: 13  Menstrual Pattern: Reg menses without dysmenorrhea or menorrhagia  STIs: denies  Sexual Activity: men, monogamous, no complaints  Contraception:  sp vasectomy    Pap Hx:  2023 - neg cotest    Preventative:  Last mammogram: BIRAD Cat 1 May 2025, follows with [facility]  Last Colonoscopy: due age 47  DM Screening: A1C 5.4% in 2025  DEXA: due age 67  HPV vaccination: not received  Exercise: biking and weight training   Diet: no meat  Seat Belt Use: " "always    SoHx:  Living Situation: with  Amilcar and 3 kids (1 boy, 2 girls) and sharath. Siblings (Tyrell) also live in town.   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 EtOH social.  Abuse: denies  Depression Screen: negative    Past Medical History:   Diagnosis Date    Migraine headache 2007    Shingles      Past Surgical History:   Procedure Laterality Date     SECTION, LOW TRANSVERSE  10/09/2017     Section Low Transverse    MOUTH SURGERY  10/09/2017    Oral Surgery Tooth Extraction [place] Tooth    SKIN BIOPSY  2018     Family History   Problem Relation Name Age of Onset    Breast cancer Mother breast cancer 49        sp hereditary testing and neg    Cancer Mother breast cancer     Brain cancer Mother's Sister      Ovarian cancer Father's Sister  60    Cancer Maternal Grandmother skin cancer      Objective   /79   Pulse 84   Ht 1.549 m (5' 1\")   Wt 75.6 kg (166 lb 9.6 oz)   LMP 2025 (Approximate)   BMI 31.48 kg/m²      General:   Alert and oriented, in no acute distress   Neck: Supple. No visible thyromegaly.    Breast/Axilla: Normal to palpation bilaterally without masses, skin changes, or nipple discharge.    Abdomen: Soft, non-tender, without masses or organomegaly   Vulva: Normal architecture without erythema, masses, or lesions.    Vagina: Normal mucosa without lesions, masses, or atrophy. No abnormal vaginal discharge.    Cervix: Normal without masses, lesions, or signs of cervicitis.    Uterus: Normal mobile, non-enlarged uterus    Adnexa: Normal without masses or lesions   Pelvic Floor No POP noted. No high tone pelvic floor    Psych Normal affect. Normal mood.      "

## 2025-05-29 ENCOUNTER — TREATMENT (OUTPATIENT)
Dept: PHYSICAL THERAPY | Facility: CLINIC | Age: 40
End: 2025-05-29
Payer: COMMERCIAL

## 2025-05-29 DIAGNOSIS — M62.81 MUSCLE WEAKNESS: ICD-10-CM

## 2025-05-29 DIAGNOSIS — M62.89 MUSCLE TIGHTNESS: ICD-10-CM

## 2025-05-29 DIAGNOSIS — N39.3 PRIMARY STRESS URINARY INCONTINENCE: ICD-10-CM

## 2025-05-29 PROCEDURE — 97535 SELF CARE MNGMENT TRAINING: CPT | Mod: GP | Performed by: PHYSICAL THERAPIST

## 2025-05-29 NOTE — PROGRESS NOTES
Physical Therapy  Physical Therapy Pelvic Floor    Name: Beatriz Mandujano  MRN: [id number]  : 1985  Encounter Date: 2025  Visit Count: 6          Insurance: MMO, no auth  Visit # 6 of 20 For     Current Problem:  1. Primary stress urinary incontinence  Follow Up In Physical Therapy      2. Muscle weakness  Follow Up In Physical Therapy      3. Muscle tightness  Follow Up In Physical Therapy          General     Precautions     Vital Signs     Pain       Subjective  Chief Complaint: stress urinary incontinence with exercises/high level activity - unexpected  - never lost complete control  - 3 children, 9 -7-5 years old  Onset: several years (5 years)  DELORES: unknown    3/13/25:   - fell down stairs, possible mild concussion  - exercises a couple of times since last visit, unsure about contraction of deep core/kegel  - no pain, mild tightness stiffness of neck and shoulders, also could be associated with life stressors    3/27/25:   - no leaking, but hasn't been doing high impact activities recently  Hep compliance: yes    25:   - kept up with kegels the most, other exericses a handful of times  - started peleton programs: body wt & up to 10# wts, no leaking (noticed soreness of lower abdomen after)    25:   - kegels at least 1x/day  - using some core exercises in her [facility]lie  - hx: 202: fracture R big toe, was in a boot for 5 weeks  - jogging with dog (10-10:30 pace)    25:   - busy month  - 1 mile runs = no leaking  - finished her Peloton program  - playing catch with son: didn't leak as quickly as she anticipated  - toe is better too    PAIN  Intensity (0-10): 0 - lower back soreness with prolonged standing  Location:   Description:     Aggravating Factors: running, jumping, soccer, hitting baseball  Relieving Factors: avoiding activities    Prior Level of Function (PLOF)  Exercise/Physical Activity: running treadmill 2-4x/week, 20-30min, incline walk, peleton charlie (weight  workouts)  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 Intermittent  Present with cough and/or sneeze? Sometimes, unexpected  Present with physical activity and/or exertion? Yes  How much urine do you leak? Dribble  Do you wear any barriers, such as pantishields or pads? No  Average Fluid Intake (glasses/day): water, tea, chocolate milk, 6-8 glasses    Objective  Posture: wnl  SL Stance: mild hip  drop  DL Squat: wnl  SL Squat: dkv and att  Standing Lumbar Mobility: wnl  SLR: wnl  Bridge: wnl - hip drop with SL march  Hip PROM: mild tightness IR/ER/flex  MMT: sup hip: 5/5  - Hamstrings: moderate tightness  - Piriformis: moderate tightness  Diaphragmatic Breathing: good  Rib Palpation/Positioning: wnl  Assess Abdomen  Rectus Abdominus: wnl  Obliques: wnl  Diastasis Recti: negative  Transverse Abdominus Contraction: good    Patient was educated on pelvic floor anatomy, function, and dysfunction.   Patient was educated on an intra-vaginal pelvic floor assessment technique and verbalized consent.   The patient is aware they have full autonomy and can stop the assessment at any time.   Patient was offered a chaperone and declined one be present.   Chaperone: Declined    Physical therapist and patient reviewed  Policy [id number] and patient was informed of their right for the opportunity of a chaperone to be present in the room during intimate examinations.  The offer of a chaperone was made to the patient.   Patient was offered the opportunity for either a family/friend or 3rd party chaperone for evaluation/treatment.  Patient declined a chaperone.    Perineal Observation - At Rest: wnl  Contraction: visible (P>A)  Relaxation: visible    Bony Palpation  Pubic symphysis: no  tenderness  Ischial Tuberosity: no tenderness  External Soft Tissue Palpation/Pelvic Clock - no tenderness    Internal Intra-vaginal Exam  - Layer 1/2/3: mild global tension throughout layers 1/2/3 - no tenderness reported  - Layer 3 Strength-MMT = 4/5  Reference Values  0 = None  1 = flicker  2 = contract only  3 = moderate (posterior > anterior)  4 = contract, lift, compression present  5 = strong lift & compression with posterior resistance    Laycock Quantitative Assessment Scale (PERF)  Power = 4/5  Endurance = 5-6/10 (decreased with further repetitions)  Repetitions + 4 sec rest interval = 3    Treatments:   Education: home exercise program, plan of care, activity modifications, pain management, and injury pathology  - Female Sexual Med Department  - Registered Dietician through [employer]  - Differences between Functional, Integrative, Primary Care, and Naturopathic Physicians    Access Code: [email or web address]  ACTIVATION  - Seated Diaphragmatic Breathing  - 1-3 x daily - 3-5 breaths hold  - Supine Pelvic Floor Contraction  - 1-3 x daily - 10 reps - 3-5 seconds hold  - Hooklying Transversus Abdominis Palpation  - 3-5 x weekly - 5 reps - 2 breaths hold    LEVEL I Strength  - Bridge  - 3-5 x weekly - 10 reps  - Supine Bridge with Mini Swiss Ball Between Knees  - 3-5 x weekly - 3 sets - 10 reps  - Bridge with Hip Abduction and Resistance  - 3-5 x weekly - 3 sets - 10 reps  - Supine Hip Adduction Isometric with Ball  - 3-5 x weekly - 3 sets - 10 reps  - Straight Leg Raise  - 3-5 x weekly - 3 sets - 10 reps  - Sidelying Hip Abduction  - 3-5 x weekly - 3 sets - 10 reps  - Beginner Prone Single Leg Raise  - 3-5 x weekly - 3 sets - 10 reps    LEVEL II Strength - continued exercises (add SL mini-squats, toe spacers, big toe flex/ext)  - Supine Transversus Abdominis Bracing with Leg Extension  - 3-5 x weekly - 10 reps  - Supine 90/90 Abdominal Bracing  - 3-5 x weekly - 10 reps  - Supine 90/90 Alternating Toe Touch  -  3-5 x weekly - 1-3 sets - 10 reps  - Supine 90/90 leg extensions  - 3-5 x weekly - 1-3 sets - 10 reps  - Ab Prep  - 3-5 x weekly - 3 sets - 10 reps  - Diagonal Curl Up with Reach  - 3-5 x weekly - 3 sets - 10 reps  - Prone Alternating Arm and Leg Lifts  - 3-5 x weekly - 1-3 sets - 10 reps  - Prone Bilateral Arm and Leg Lift  - 3-5 x weekly - 1-3 sets - 10 reps    Tx Codes:    Self Care x2    Plan for Next Visit: assess response to hep  - core/hip/PF strength & endurance progression    Assessment: Patient presents with signs and symptoms consistent with stress urinary incontinence, resulting in limited participation in pain-free ADLs and inability to perform at their prior level of function. Pt would benefit from physical therapy to address the impairments found & listed previously in the objective section in order to return to safe and pain-free ADLs and prior level of function.   PT Assessment Results: muscle weakness, muscle tightness   Rehab Prognosis: good   Clinical Presentation: stable  - intravaginal pelvic floor assessment performed today and revealed pelvic floor muscle weakness (4/5) and lacking endurance (5-6 sec hold)  - improved deep core activation and breathing with exercises, tolerated progression well    Plan:   Planned Interventions include: therapeutic exercise, self-care home management, manual therapy, therapeutic activities, gait training, neuromuscular coordination, aquatic therapy  Patient and/or family understands and agrees with goals and plan documented: yes  Frequency: 2x/month  Duration: 2-4 months     Suzie Jackson, PT

## 2025-06-02 ENCOUNTER — APPOINTMENT (OUTPATIENT)
Dept: OBSTETRICS AND GYNECOLOGY | Facility: CLINIC | Age: 40
End: 2025-06-02
Payer: COMMERCIAL

## 2025-06-30 ENCOUNTER — APPOINTMENT (OUTPATIENT)
Dept: PHYSICAL THERAPY | Facility: CLINIC | Age: 40
End: 2025-06-30
Payer: COMMERCIAL

## 2025-07-29 ENCOUNTER — TELEPHONE (OUTPATIENT)
Dept: DERMATOLOGY | Facility: CLINIC | Age: 40
End: 2025-07-29
Payer: COMMERCIAL

## 2025-07-29 NOTE — TELEPHONE ENCOUNTER
I spoke with pt she had her re excision done already at [facility] at [place] office .. I called and left message for them to please send surgery notes to our office fax # provided .. [phone]

## 2025-08-21 ENCOUNTER — APPOINTMENT (OUTPATIENT)
Dept: OBSTETRICS AND GYNECOLOGY | Facility: CLINIC | Age: 40
End: 2025-08-21
Payer: COMMERCIAL